# Patient Record
Sex: FEMALE | Race: BLACK OR AFRICAN AMERICAN | Employment: UNEMPLOYED | URBAN - METROPOLITAN AREA
[De-identification: names, ages, dates, MRNs, and addresses within clinical notes are randomized per-mention and may not be internally consistent; named-entity substitution may affect disease eponyms.]

---

## 2017-04-23 ENCOUNTER — HOSPITAL ENCOUNTER (EMERGENCY)
Age: 52
Discharge: HOME | End: 2017-04-23
Attending: STUDENT IN AN ORGANIZED HEALTH CARE EDUCATION/TRAINING PROGRAM | Admitting: STUDENT IN AN ORGANIZED HEALTH CARE EDUCATION/TRAINING PROGRAM
Payer: COMMERCIAL

## 2017-04-23 VITALS
DIASTOLIC BLOOD PRESSURE: 84 MMHG | BODY MASS INDEX: 27.16 KG/M2 | SYSTOLIC BLOOD PRESSURE: 134 MMHG | RESPIRATION RATE: 18 BRPM | TEMPERATURE: 99 F | OXYGEN SATURATION: 100 % | WEIGHT: 163 LBS | HEART RATE: 78 BPM | HEIGHT: 65 IN

## 2017-04-23 DIAGNOSIS — R05.9 COUGH: ICD-10-CM

## 2017-04-23 DIAGNOSIS — R03.0 ELEVATED BLOOD PRESSURE READING: Primary | ICD-10-CM

## 2017-04-23 LAB
ALBUMIN SERPL BCP-MCNC: 3.7 G/DL (ref 3.5–5)
ALBUMIN/GLOB SERPL: 0.8 {RATIO} (ref 1.1–2.2)
ALP SERPL-CCNC: 71 U/L (ref 45–117)
ALT SERPL-CCNC: 21 U/L (ref 12–78)
ANION GAP BLD CALC-SCNC: 5 MMOL/L (ref 5–15)
AST SERPL W P-5'-P-CCNC: 7 U/L (ref 15–37)
BASOPHILS # BLD AUTO: 0 K/UL (ref 0–0.1)
BASOPHILS # BLD: 0 % (ref 0–1)
BILIRUB SERPL-MCNC: 0.6 MG/DL (ref 0.2–1)
BUN SERPL-MCNC: 9 MG/DL (ref 6–20)
BUN/CREAT SERPL: 12 (ref 12–20)
CALCIUM SERPL-MCNC: 8.5 MG/DL (ref 8.5–10.1)
CHLORIDE SERPL-SCNC: 104 MMOL/L (ref 97–108)
CO2 SERPL-SCNC: 28 MMOL/L (ref 21–32)
CREAT SERPL-MCNC: 0.76 MG/DL (ref 0.55–1.02)
EOSINOPHIL # BLD: 0.1 K/UL (ref 0–0.4)
EOSINOPHIL NFR BLD: 1 % (ref 0–7)
ERYTHROCYTE [DISTWIDTH] IN BLOOD BY AUTOMATED COUNT: 14.6 % (ref 11.5–14.5)
GLOBULIN SER CALC-MCNC: 4.8 G/DL (ref 2–4)
GLUCOSE SERPL-MCNC: 87 MG/DL (ref 65–100)
HCT VFR BLD AUTO: 36.4 % (ref 35–47)
HGB BLD-MCNC: 11.7 G/DL (ref 11.5–16)
LYMPHOCYTES # BLD AUTO: 11 % (ref 12–49)
LYMPHOCYTES # BLD: 0.9 K/UL (ref 0.8–3.5)
MCH RBC QN AUTO: 28.5 PG (ref 26–34)
MCHC RBC AUTO-ENTMCNC: 32.1 G/DL (ref 30–36.5)
MCV RBC AUTO: 88.8 FL (ref 80–99)
MONOCYTES # BLD: 1 K/UL (ref 0–1)
MONOCYTES NFR BLD AUTO: 12 % (ref 5–13)
NEUTS SEG # BLD: 6.1 K/UL (ref 1.8–8)
NEUTS SEG NFR BLD AUTO: 76 % (ref 32–75)
PLATELET # BLD AUTO: 277 K/UL (ref 150–400)
POTASSIUM SERPL-SCNC: 3.7 MMOL/L (ref 3.5–5.1)
PROT SERPL-MCNC: 8.5 G/DL (ref 6.4–8.2)
RBC # BLD AUTO: 4.1 M/UL (ref 3.8–5.2)
SODIUM SERPL-SCNC: 137 MMOL/L (ref 136–145)
TROPONIN I SERPL-MCNC: <0.04 NG/ML
WBC # BLD AUTO: 8.1 K/UL (ref 3.6–11)

## 2017-04-23 PROCEDURE — 85025 COMPLETE CBC W/AUTO DIFF WBC: CPT | Performed by: STUDENT IN AN ORGANIZED HEALTH CARE EDUCATION/TRAINING PROGRAM

## 2017-04-23 PROCEDURE — 84484 ASSAY OF TROPONIN QUANT: CPT | Performed by: STUDENT IN AN ORGANIZED HEALTH CARE EDUCATION/TRAINING PROGRAM

## 2017-04-23 PROCEDURE — 99283 EMERGENCY DEPT VISIT LOW MDM: CPT

## 2017-04-23 PROCEDURE — 36415 COLL VENOUS BLD VENIPUNCTURE: CPT | Performed by: STUDENT IN AN ORGANIZED HEALTH CARE EDUCATION/TRAINING PROGRAM

## 2017-04-23 PROCEDURE — 80053 COMPREHEN METABOLIC PANEL: CPT | Performed by: STUDENT IN AN ORGANIZED HEALTH CARE EDUCATION/TRAINING PROGRAM

## 2017-04-23 PROCEDURE — 93005 ELECTROCARDIOGRAM TRACING: CPT

## 2017-04-23 RX ORDER — BENZONATATE 100 MG/1
100 CAPSULE ORAL
Qty: 30 CAP | Refills: 0 | Status: SHIPPED | OUTPATIENT
Start: 2017-04-23 | End: 2017-04-30

## 2017-04-23 NOTE — DISCHARGE INSTRUCTIONS
Elevated Blood Pressure: Care Instructions  Your Care Instructions    Blood pressure is a measure of how hard the blood pushes against the walls of your arteries. It's normal for blood pressure to go up and down throughout the day. But if it stays up over time, you have high blood pressure. Two numbers tell you your blood pressure. The first number is the systolic pressure. It shows how hard the blood pushes when your heart is pumping. The second number is the diastolic pressure. It shows how hard the blood pushes between heartbeats, when your heart is relaxed and filling with blood. An ideal blood pressure in adults is less than 120/80 (say \"120 over 80\"). High blood pressure is 140/90 or higher. You have high blood pressure if your top number is 140 or higher or your bottom number is 90 or higher, or both. The main test for high blood pressure is simple, fast, and painless. To diagnose high blood pressure, your doctor will test your blood pressure at different times. After testing your blood pressure, your doctor may ask you to test it again when you are home. If you are diagnosed with high blood pressure, you can work with your doctor to make a long-term plan to manage it. Follow-up care is a key part of your treatment and safety. Be sure to make and go to all appointments, and call your doctor if you are having problems. It's also a good idea to know your test results and keep a list of the medicines you take. How can you care for yourself at home? · Do not smoke. Smoking increases your risk for heart attack and stroke. If you need help quitting, talk to your doctor about stop-smoking programs and medicines. These can increase your chances of quitting for good. · Stay at a healthy weight. · Try to limit how much sodium you eat to less than 2,300 milligrams (mg) a day. Your doctor may ask you to try to eat less than 1,500 mg a day. · Be physically active.  Get at least 30 minutes of exercise on most days of the week. Walking is a good choice. You also may want to do other activities, such as running, swimming, cycling, or playing tennis or team sports. · Avoid or limit alcohol. Talk to your doctor about whether you can drink any alcohol. · Eat plenty of fruits, vegetables, and low-fat dairy products. Eat less saturated and total fats. · Learn how to check your blood pressure at home. When should you call for help? Call your doctor now or seek immediate medical care if:  · Your blood pressure is much higher than normal (such as 180/110 or higher). · You think high blood pressure is causing symptoms such as:  ¨ Severe headache. ¨ Blurry vision. Watch closely for changes in your health, and be sure to contact your doctor if:  · You do not get better as expected. Where can you learn more? Go to http://[employer] Solutionmando.info/. Enter W989 in the search box to learn more about \"Elevated Blood Pressure: Care Instructions. \"  Current as of: October 19, 2016  Content Version: 11.2  © 7299-8206 [employer]. Care instructions adapted under license by [employer] (which disclaims liability or warranty for this information). If you have questions about a medical condition or this instruction, always ask your healthcare professional. [address] any warranty or liability for your use of this information. Cough: Care Instructions  Your Care Instructions  A cough is your body's response to something that bothers your throat or airways. Many things can cause a cough. You might cough because of a cold or the flu, bronchitis, or asthma. Smoking, postnasal drip, allergies, and stomach acid that backs up into your throat also can cause coughs. A cough is a symptom, not a disease. Most coughs stop when the cause, such as a cold, goes away. You can take a few steps at home to cough less and feel better.   Follow-up care is a key part of your treatment and safety. Be sure to make and go to all appointments, and call your doctor if you are having problems. It's also a good idea to know your test results and keep a list of the medicines you take. How can you care for yourself at home? · Drink lots of water and other fluids. This helps thin the mucus and soothes a dry or sore throat. Honey or lemon juice in hot water or tea may ease a dry cough. · Take cough medicine as directed by your doctor. · Prop up your head on pillows to help you breathe and ease a dry cough. · Try cough drops to soothe a dry or sore throat. Cough drops don't stop a cough. Medicine-flavored cough drops are no better than candy-flavored drops or hard candy. · Do not smoke. Avoid secondhand smoke. If you need help quitting, talk to your doctor about stop-smoking programs and medicines. These can increase your chances of quitting for good. When should you call for help? Call 911 anytime you think you may need emergency care. For example, call if:  · You have severe trouble breathing. Call your doctor now or seek immediate medical care if:  · You cough up blood. · You have new or worse trouble breathing. · You have a new or higher fever. · You have a new rash. Watch closely for changes in your health, and be sure to contact your doctor if:  · You cough more deeply or more often, especially if you notice more mucus or a change in the color of your mucus. · You have new symptoms, such as a sore throat, an earache, or sinus pain. · You do not get better as expected. Where can you learn more? Go to http://davin-mando.info/. Enter D279 in the search box to learn more about \"Cough: Care Instructions. \"  Current as of: May 27, 2016  Content Version: 11.2  © 6332-8036 [employer]. Care instructions adapted under license by [employer] (which disclaims liability or warranty for this information).  If you have questions about a medical condition or this instruction, always ask your healthcare professional. [place] [address] any warranty or liability for your use of this information. We hope that we have addressed all of your medical concerns. The examination and treatment you received in the Emergency Department were for an emergent problem and were not intended as complete care. It is important that you follow up with your healthcare provider(s) for ongoing care. If your symptoms worsen or do not improve as expected, and you are unable to reach your usual health care provider(s), you should return to the Emergency Department. Today's healthcare is undergoing tremendous change, and patient satisfaction surveys are one of the many tools to assess the quality of medical care. You may receive a survey from the [facility] regarding your experience in the Emergency Department. I hope that your experience has been completely positive, particularly the medical care that I provided. As such, please participate in the survey; anything less than excellent does not meet my expectations or intentions. [facility] and [address] [place] [address] participate in nationally recognized quality of care measures. If your blood pressure is greater than 120/80, as reported below, we urge that you seek medical care to address the potential of high blood pressure, commonly known as hypertension. Hypertension can be hereditary or can be caused by certain medical conditions, pain, stress, or \"white coat syndrome. \"       Please make an appointment with your health care provider(s) for follow up of your Emergency Department visit. VITALS:   Patient Vitals for the past 8 hrs:   Temp Pulse Resp BP SpO2   04/23/17 1622 99.1 °F (37.3 °C) 78 16 (!) 159/102 98 %          Thank you for allowing us to provide you with medical care today. We realize that you have many choices for your emergency care needs. Please choose us in the future for any continued health care needs. Kyle Mendoza, [facility].   Office: [phone]            Recent Results (from the past 24 hour(s))   CBC WITH AUTOMATED DIFF    Collection Time: 04/23/17  4:35 PM   Result Value Ref Range    WBC 8.1 3.6 - 11.0 K/uL    RBC 4.10 3.80 - 5.20 M/uL    HGB 11.7 11.5 - 16.0 g/dL    HCT 36.4 35.0 - 47.0 %    MCV 88.8 80.0 - 99.0 FL    MCH 28.5 26.0 - 34.0 PG    MCHC 32.1 30.0 - 36.5 g/dL    RDW 14.6 (H) 11.5 - 14.5 %    PLATELET 488 431 - 002 K/uL    NEUTROPHILS 76 (H) 32 - 75 %    LYMPHOCYTES 11 (L) 12 - 49 %    MONOCYTES 12 5 - 13 %    EOSINOPHILS 1 0 - 7 %    BASOPHILS 0 0 - 1 %    ABS. NEUTROPHILS 6.1 1.8 - 8.0 K/UL    ABS. LYMPHOCYTES 0.9 0.8 - 3.5 K/UL    ABS. MONOCYTES 1.0 0.0 - 1.0 K/UL    ABS. EOSINOPHILS 0.1 0.0 - 0.4 K/UL    ABS. BASOPHILS 0.0 0.0 - 0.1 K/UL   METABOLIC PANEL, COMPREHENSIVE    Collection Time: 04/23/17  4:35 PM   Result Value Ref Range    Sodium 137 136 - 145 mmol/L    Potassium 3.7 3.5 - 5.1 mmol/L    Chloride 104 97 - 108 mmol/L    CO2 28 21 - 32 mmol/L    Anion gap 5 5 - 15 mmol/L    Glucose 87 65 - 100 mg/dL    BUN 9 6 - 20 MG/DL    Creatinine 0.76 0.55 - 1.02 MG/DL    BUN/Creatinine ratio 12 12 - 20      GFR est AA >60 >60 ml/min/1.73m2    GFR est non-AA >60 >60 ml/min/1.73m2    Calcium 8.5 8.5 - 10.1 MG/DL    Bilirubin, total 0.6 0.2 - 1.0 MG/DL    ALT (SGPT) 21 12 - 78 U/L    AST (SGOT) 7 (L) 15 - 37 U/L    Alk.  phosphatase 71 45 - 117 U/L    Protein, total 8.5 (H) 6.4 - 8.2 g/dL    Albumin 3.7 3.5 - 5.0 g/dL    Globulin 4.8 (H) 2.0 - 4.0 g/dL    A-G Ratio 0.8 (L) 1.1 - 2.2     TROPONIN I    Collection Time: 04/23/17  4:35 PM   Result Value Ref Range    Troponin-I, Qt. <0.04 <0.05 ng/mL   EKG, 12 LEAD, INITIAL    Collection Time: 04/23/17  4:37 PM   Result Value Ref Range    Ventricular Rate 84 BPM    Atrial Rate 84 BPM    P-R Interval 130 ms    QRS Duration 76 ms Q-T Interval 376 ms    QTC Calculation (Bezet) 444 ms    Calculated P Axis 76 degrees    Calculated R Axis 74 degrees    Calculated T Axis 42 degrees    Diagnosis       Normal sinus rhythm  Nonspecific ST abnormality  When compared with ECG of 26-DEC-2014 16:26,  No significant change was found         No results found.

## 2017-04-23 NOTE — ED TRIAGE NOTES
TRIAGE NOTE: \"I went to [facility] because of some upper respiratory issues. She checked my pressure and told me to come here, it was 150/110.

## 2017-04-23 NOTE — ED PROVIDER NOTES
HPI Comments: 49 y.o. female with past medical history significant for asthma and hypothyroidism who presents from home with chief complaint of hypertension. Pt states that she has had a sore throat, cough, and lightheadedness for the past week which has been progressively worsening. She reports going to Urgent Care 1 week ago and was diagnosed with seasonal allergies. Pt states that she went to [facility] today and was told to come to the ER after having her blood pressure evaluated. She reports that her blood pressure readings were 160/110 and 155/110. Pt reportedly has an appointment to receive a chest x-ray tomorrow and is seeing a lung specialist on Tuesday for her asthma. She denies having any hx of HTN. She denies any leg swelling. There are no other acute medical concerns at this time. Social hx: nonsmoker, no EtOH use, no drug use  PCP: Janell Caicedo MD    Note written by Tanya Artis, as dictated by Fallon Schafer MD 5:22 PM      The history is provided by the patient. No  was used. Past Medical History:   Diagnosis Date    Asthma     Hyperthyroidism        Past Surgical History:   Procedure Laterality Date    BREAST SURGERY PROCEDURE UNLISTED      cyst left breast    HX HYSTERECTOMY      still has ovaries    HX POLYPECTOMY      removed from nose         History reviewed. No pertinent family history. Social History     Social History    Marital status: SINGLE     Spouse name: N/A    Number of children: N/A    Years of education: N/A     Occupational History    Not on file. Social History Main Topics    Smoking status: Never Smoker    Smokeless tobacco: Not on file    Alcohol use No    Drug use: No    Sexual activity: Not on file     Other Topics Concern    Not on file     Social History Narrative         ALLERGIES: Latex; Other food; Sulfa (sulfonamide antibiotics);  Sweet potato; and Wheat    Review of Systems   Constitutional: Negative for chills, diaphoresis and fever. HENT: Positive for sore throat. Negative for congestion, postnasal drip and rhinorrhea. Eyes: Negative for photophobia, discharge, redness and visual disturbance. Respiratory: Positive for cough. Negative for chest tightness, shortness of breath and wheezing. Cardiovascular: Negative for chest pain, palpitations and leg swelling. Gastrointestinal: Negative for abdominal distention, abdominal pain, blood in stool, constipation, diarrhea, nausea and vomiting. Genitourinary: Negative for difficulty urinating, dysuria, frequency, hematuria and urgency. Musculoskeletal: Positive for myalgias (generalized). Negative for arthralgias, back pain and joint swelling. Skin: Negative for color change and rash. Neurological: Positive for light-headedness. Negative for dizziness, speech difficulty, weakness, numbness and headaches. Psychiatric/Behavioral: Negative for confusion. The patient is not nervous/anxious. All other systems reviewed and are negative. Vitals:    04/23/17 1622   BP: (!) 159/102   Pulse: 78   Resp: 16   Temp: 99.1 °F (37.3 °C)   SpO2: 98%   Weight: 73.9 kg (163 lb)   Height: 5' 5\" (1.651 m)            Physical Exam   Constitutional: She is oriented to person, place, and time. She appears well-developed and well-nourished. HENT:   Head: Normocephalic and atraumatic. Eyes: Conjunctivae and EOM are normal. Pupils are equal, round, and reactive to light. Neck: Normal range of motion. Neck supple. Cardiovascular: Normal rate, regular rhythm and normal heart sounds. No murmur heard. Pulmonary/Chest: Effort normal. No respiratory distress. She has wheezes (bilaterally in bases). She has rhonchi (bilaterally in bases). Abdominal: Soft. Bowel sounds are normal. She exhibits no distension. There is no tenderness. There is no rebound. Musculoskeletal: Normal range of motion. She exhibits no edema.    Neurological: She is alert and oriented to person, place, and time. No cranial nerve deficit. She exhibits normal muscle tone. Coordination normal.   Skin: Skin is warm and dry. No rash noted. Psychiatric: She has a normal mood and affect. Her behavior is normal.   Nursing note and vitals reviewed. Note written by Tanya Vallecillo, as dictated by Martha Lyons MD 5:22 PM    MDM  Number of Diagnoses or Management Options  Cough:   Elevated blood pressure reading:   Diagnosis management comments: No evidence of end-organ damage. Will have pt follow up with PCP for BP check and initiation of anti-hypertensive medication if needed. ED Course       Procedures   ED EKG interpretation:  Rhythm: normal sinus rhythm; and regular . Rate (approx.): 84; Axis: normal; ST/T wave: non-specific changes; no STEMI, no acute ischemia.   Note written by Tanya Vallecillo, as dictated by Martha Lyons MD 4:37 PM

## 2017-04-24 ENCOUNTER — HOSPITAL ENCOUNTER (OUTPATIENT)
Dept: GENERAL RADIOLOGY | Age: 52
Discharge: HOME | End: 2017-04-24
Payer: COMMERCIAL

## 2017-04-24 DIAGNOSIS — J45.909 ASTHMA: ICD-10-CM

## 2017-04-24 LAB
ATRIAL RATE: 84 BPM
CALCULATED P AXIS, ECG09: 76 DEGREES
CALCULATED R AXIS, ECG10: 74 DEGREES
CALCULATED T AXIS, ECG11: 42 DEGREES
DIAGNOSIS, 93000: NORMAL
P-R INTERVAL, ECG05: 130 MS
Q-T INTERVAL, ECG07: 376 MS
QRS DURATION, ECG06: 76 MS
QTC CALCULATION (BEZET), ECG08: 444 MS
VENTRICULAR RATE, ECG03: 84 BPM

## 2017-04-24 PROCEDURE — 71020 XR CHEST PA LAT: CPT

## 2018-10-26 ENCOUNTER — APPOINTMENT (OUTPATIENT)
Dept: CT IMAGING | Age: 53
End: 2018-10-26
Attending: EMERGENCY MEDICINE
Payer: SELF-PAY

## 2018-10-26 ENCOUNTER — HOSPITAL ENCOUNTER (EMERGENCY)
Age: 53
Discharge: HOME | End: 2018-10-26
Attending: EMERGENCY MEDICINE
Payer: SELF-PAY

## 2018-10-26 VITALS
WEIGHT: 160.27 LBS | RESPIRATION RATE: 16 BRPM | HEART RATE: 54 BPM | SYSTOLIC BLOOD PRESSURE: 132 MMHG | OXYGEN SATURATION: 98 % | TEMPERATURE: 98.1 F | DIASTOLIC BLOOD PRESSURE: 88 MMHG | BODY MASS INDEX: 25.76 KG/M2 | HEIGHT: 66 IN

## 2018-10-26 DIAGNOSIS — R03.0 ELEVATED BLOOD PRESSURE READING: ICD-10-CM

## 2018-10-26 DIAGNOSIS — J32.8 OTHER CHRONIC SINUSITIS: Primary | ICD-10-CM

## 2018-10-26 DIAGNOSIS — M79.602 LEFT ARM PAIN: ICD-10-CM

## 2018-10-26 DIAGNOSIS — R51.9 ACUTE NONINTRACTABLE HEADACHE, UNSPECIFIED HEADACHE TYPE: ICD-10-CM

## 2018-10-26 DIAGNOSIS — R42 LIGHTHEADEDNESS: ICD-10-CM

## 2018-10-26 LAB
ALBUMIN SERPL-MCNC: 3.6 G/DL (ref 3.5–5)
ALBUMIN/GLOB SERPL: 0.9 {RATIO} (ref 1.1–2.2)
ALP SERPL-CCNC: 66 U/L (ref 45–117)
ALT SERPL-CCNC: 25 U/L (ref 12–78)
ANION GAP SERPL CALC-SCNC: 3 MMOL/L (ref 5–15)
AST SERPL-CCNC: 17 U/L (ref 15–37)
BASOPHILS # BLD: 0 K/UL (ref 0–0.1)
BASOPHILS NFR BLD: 0 % (ref 0–1)
BILIRUB SERPL-MCNC: 0.3 MG/DL (ref 0.2–1)
BUN SERPL-MCNC: 6 MG/DL (ref 6–20)
BUN/CREAT SERPL: 8 (ref 12–20)
CALCIUM SERPL-MCNC: 8.3 MG/DL (ref 8.5–10.1)
CHLORIDE SERPL-SCNC: 106 MMOL/L (ref 97–108)
CK SERPL-CCNC: 79 U/L (ref 26–192)
CO2 SERPL-SCNC: 31 MMOL/L (ref 21–32)
COMMENT, HOLDF: NORMAL
CREAT SERPL-MCNC: 0.77 MG/DL (ref 0.55–1.02)
DIFFERENTIAL METHOD BLD: ABNORMAL
EOSINOPHIL # BLD: 0 K/UL (ref 0–0.4)
EOSINOPHIL NFR BLD: 0 % (ref 0–7)
ERYTHROCYTE [DISTWIDTH] IN BLOOD BY AUTOMATED COUNT: 13.2 % (ref 11.5–14.5)
GLOBULIN SER CALC-MCNC: 3.9 G/DL (ref 2–4)
GLUCOSE SERPL-MCNC: 108 MG/DL (ref 65–100)
HCT VFR BLD AUTO: 33.8 % (ref 35–47)
HGB BLD-MCNC: 11 G/DL (ref 11.5–16)
IMM GRANULOCYTES # BLD: 0 K/UL (ref 0–0.04)
IMM GRANULOCYTES NFR BLD AUTO: 0 % (ref 0–0.5)
LYMPHOCYTES # BLD: 0.6 K/UL (ref 0.8–3.5)
LYMPHOCYTES NFR BLD: 16 % (ref 12–49)
MAGNESIUM SERPL-MCNC: 1.8 MG/DL (ref 1.6–2.4)
MCH RBC QN AUTO: 30.1 PG (ref 26–34)
MCHC RBC AUTO-ENTMCNC: 32.5 G/DL (ref 30–36.5)
MCV RBC AUTO: 92.3 FL (ref 80–99)
MONOCYTES # BLD: 0.2 K/UL (ref 0–1)
MONOCYTES NFR BLD: 4 % (ref 5–13)
NEUTS SEG # BLD: 3 K/UL (ref 1.8–8)
NEUTS SEG NFR BLD: 80 % (ref 32–75)
NRBC # BLD: 0 K/UL (ref 0–0.01)
NRBC BLD-RTO: 0 PER 100 WBC
PLATELET # BLD AUTO: 169 K/UL (ref 150–400)
PMV BLD AUTO: 10.7 FL (ref 8.9–12.9)
POTASSIUM SERPL-SCNC: 4.5 MMOL/L (ref 3.5–5.1)
PROT SERPL-MCNC: 7.5 G/DL (ref 6.4–8.2)
RBC # BLD AUTO: 3.66 M/UL (ref 3.8–5.2)
RBC MORPH BLD: ABNORMAL
SAMPLES BEING HELD,HOLD: NORMAL
SODIUM SERPL-SCNC: 140 MMOL/L (ref 136–145)
TROPONIN I SERPL-MCNC: <0.05 NG/ML
WBC # BLD AUTO: 3.8 K/UL (ref 3.6–11)

## 2018-10-26 PROCEDURE — 85025 COMPLETE CBC W/AUTO DIFF WBC: CPT | Performed by: EMERGENCY MEDICINE

## 2018-10-26 PROCEDURE — 83735 ASSAY OF MAGNESIUM: CPT | Performed by: EMERGENCY MEDICINE

## 2018-10-26 PROCEDURE — 36415 COLL VENOUS BLD VENIPUNCTURE: CPT | Performed by: EMERGENCY MEDICINE

## 2018-10-26 PROCEDURE — 84484 ASSAY OF TROPONIN QUANT: CPT | Performed by: EMERGENCY MEDICINE

## 2018-10-26 PROCEDURE — 82550 ASSAY OF CK (CPK): CPT | Performed by: EMERGENCY MEDICINE

## 2018-10-26 PROCEDURE — 99285 EMERGENCY DEPT VISIT HI MDM: CPT

## 2018-10-26 PROCEDURE — 80053 COMPREHEN METABOLIC PANEL: CPT | Performed by: EMERGENCY MEDICINE

## 2018-10-26 PROCEDURE — 70450 CT HEAD/BRAIN W/O DYE: CPT

## 2018-10-26 PROCEDURE — 93005 ELECTROCARDIOGRAM TRACING: CPT

## 2018-10-26 RX ORDER — METHOCARBAMOL 750 MG/1
750 TABLET, FILM COATED ORAL
Qty: 20 TAB | Refills: 0 | Status: SHIPPED | OUTPATIENT
Start: 2018-10-26 | End: 2018-12-28

## 2018-10-26 NOTE — ED NOTES
Pt ambulated to restroom with no disturbance in gait. Pt has UA bedside if needed. WCTM. Pt on monitors x 2. Pt. resting comfortably in bed, denies needs at this time. Bed locked and low, call bell in reach.

## 2018-10-26 NOTE — ED NOTES
Bedside and Verbal shift change report given to Tyrell Cali RN (oncoming nurse) by Heather Pacheco RN (offgoing nurse). Report included the following information SBAR, Kardex, ED Summary, [facility] and Recent Results.

## 2018-10-26 NOTE — ED PROVIDER NOTES
EMERGENCY DEPARTMENT HISTORY AND PHYSICAL EXAM 
 
 
Date: 10/26/2018 Patient Name: Diamond Hartmann History of Presenting Illness Chief Complaint Patient presents with  Dizziness Patient ambulatory to triage with steady gait and complain of dizziness and \"high blood pressure\" History Provided By: Patient HPI: Diamond Hartmann, 47 y.o. female with PMHx significant for hyperthyroidism, asthma, presents ambulatory to the ED with cc of new onset elevated BP, HA and substernal CP with associated L sided facial numbness, nausea, lightheadedness and LUE pain x 3 days. Pt states she has been persistently lightheaded for 3 weeks. She additionally states that her LUE pain has been affecting her since August 2018. She attributed her CP to her hx of bronchitis and has been using her home inhaler. Pt initially though that her LUE pain was due to a sports related injury, so she went to her PCP. There, her BP was in the 925'D systolic. Pt was concerned about her other sxs along with her elevated BP, so she came into the ED [facility] ED for evaluation. Pt has been managing her HA and CP with OTC tylenol. Denies head trauma, neck pain, extremity weakness, vision changes, vomiting, personal hx of HTN, fever or chills. Endorses familal hx of HTN. Pt does not smoke cigarettes or drink alcohol. There are no other complaints, changes, or physical findings at this time. PCP: Enma Dodd MD 
 
Current Outpatient Medications Medication Sig Dispense Refill  methocarbamol (ROBAXIN-750) 750 mg tablet Take 1 Tab by mouth four (4) times daily as needed. 20 Tab 0  
 methimazole (TAPAZOLE) 10 mg tablet Take 10 mg by mouth daily.  ergocalciferol (VITAMIN D2) 50,000 unit capsule Take 50,000 Units by mouth. Patient takes on Thursday's  albuterol (VENTOLIN HFA) 90 mcg/actuation inhaler Take 2 puffs by inhalation every four (4) hours as needed for Wheezing.  ALBUTEROL IN Take 2 puffs by inhalation every four (4) hours as needed.  albuterol (PROVENTIL HFA) 90 mcg/actuation inhaler Take 2 puffs by inhalation every four (4) hours as needed for Wheezing. 1 Inhaler 0 Past History Past Medical History: 
Past Medical History:  
Diagnosis Date  Asthma  Hyperthyroidism Past Surgical History: 
Past Surgical History:  
Procedure Laterality Date  BREAST SURGERY PROCEDURE UNLISTED    
 cyst left breast  
 HX HYSTERECTOMY    
 still has ovaries  HX POLYPECTOMY    
 removed from nose Family History: No family history on file. Social History: 
Social History Tobacco Use  Smoking status: Never Smoker Substance Use Topics  Alcohol use: No  
 Drug use: No  
 
 
Allergies: Allergies Allergen Reactions  Latex Rash  Other Food Hives Rye  Advil [Ibuprofen] Anaphylaxis  Sulfa (Sulfonamide Antibiotics) Rash  Sweet Potato Hives  Wheat Hives Review of Systems Review of Systems Constitutional: Negative for chills and fever. HENT: Negative for congestion. Eyes: Negative. Negative for visual disturbance. Respiratory: Negative for chest tightness. Cardiovascular: Positive for chest pain (substernal). Gastrointestinal: Positive for nausea. Negative for vomiting. Endocrine: Negative for heat intolerance. Genitourinary: Negative. Musculoskeletal: Positive for myalgias (LUE). Negative for neck pain. Skin: Negative for rash. Allergic/Immunologic: Negative for immunocompromised state. Neurological: Positive for light-headedness, numbness (facial) and headaches. Negative for weakness. Hematological: Does not bruise/bleed easily. Psychiatric/Behavioral: Negative. All other systems reviewed and are negative. Physical Exam  
Physical Exam  
Constitutional: She is oriented to person, place, and time. She appears well-developed and well-nourished. No distress (NAD). HENT:  
 Head: Normocephalic and atraumatic. Eyes: EOM are normal. Pupils are equal, round, and reactive to light. Neck: Normal range of motion. Neck supple. Cardiovascular: Normal rate, regular rhythm and normal heart sounds. Pulmonary/Chest: Effort normal and breath sounds normal. No respiratory distress. Abdominal: Soft. Bowel sounds are normal. She exhibits no mass. There is no tenderness. Musculoskeletal: Normal range of motion. She exhibits no edema. Neurological: She is alert and oriented to person, place, and time. Coordination normal.  
decreased sensation LUE, motor/sensory intact Skin: Skin is warm and dry. Psychiatric: Her behavior is normal. Her mood appears anxious. anxious Nursing note and vitals reviewed. Diagnostic Study Results Labs - Recent Results (from the past 12 hour(s)) EKG, 12 LEAD, INITIAL Collection Time: 10/26/18  5:49 PM  
Result Value Ref Range Ventricular Rate 59 BPM  
 Atrial Rate 59 BPM  
 P-R Interval 132 ms QRS Duration 78 ms Q-T Interval 452 ms QTC Calculation (Bezet) 447 ms Calculated P Axis 67 degrees Calculated R Axis 14 degrees Calculated T Axis 34 degrees Diagnosis Sinus bradycardia Possible Left atrial enlargement Cannot rule out Anterior infarct , age undetermined When compared with ECG of 23-APR-2017 16:37, 
QRS voltage has decreased CBC WITH AUTOMATED DIFF Collection Time: 10/26/18  6:52 PM  
Result Value Ref Range WBC 3.8 3.6 - 11.0 K/uL  
 RBC 3.66 (L) 3.80 - 5.20 M/uL  
 HGB 11.0 (L) 11.5 - 16.0 g/dL HCT 33.8 (L) 35.0 - 47.0 % MCV 92.3 80.0 - 99.0 FL  
 MCH 30.1 26.0 - 34.0 PG  
 MCHC 32.5 30.0 - 36.5 g/dL  
 RDW 13.2 11.5 - 14.5 % PLATELET 042 882 - 469 K/uL MPV 10.7 8.9 - 12.9 FL  
 NRBC 0.0 0  WBC ABSOLUTE NRBC 0.00 0.00 - 0.01 K/uL NEUTROPHILS 80 (H) 32 - 75 % LYMPHOCYTES 16 12 - 49 % MONOCYTES 4 (L) 5 - 13 % EOSINOPHILS 0 0 - 7 % BASOPHILS 0 0 - 1 % IMMATURE GRANULOCYTES 0 0.0 - 0.5 % ABS. NEUTROPHILS 3.0 1.8 - 8.0 K/UL  
 ABS. LYMPHOCYTES 0.6 (L) 0.8 - 3.5 K/UL  
 ABS. MONOCYTES 0.2 0.0 - 1.0 K/UL  
 ABS. EOSINOPHILS 0.0 0.0 - 0.4 K/UL  
 ABS. BASOPHILS 0.0 0.0 - 0.1 K/UL  
 ABS. IMM. GRANS. 0.0 0.00 - 0.04 K/UL  
 DF AUTOMATED    
 RBC COMMENTS NORMOCYTIC, NORMOCHROMIC METABOLIC PANEL, COMPREHENSIVE Collection Time: 10/26/18  6:52 PM  
Result Value Ref Range Sodium 140 136 - 145 mmol/L Potassium 4.5 3.5 - 5.1 mmol/L Chloride 106 97 - 108 mmol/L  
 CO2 31 21 - 32 mmol/L Anion gap 3 (L) 5 - 15 mmol/L Glucose 108 (H) 65 - 100 mg/dL BUN 6 6 - 20 MG/DL Creatinine 0.77 0.55 - 1.02 MG/DL  
 BUN/Creatinine ratio 8 (L) 12 - 20 GFR est AA >60 >60 ml/min/1.73m2 GFR est non-AA >60 >60 ml/min/1.73m2 Calcium 8.3 (L) 8.5 - 10.1 MG/DL Bilirubin, total 0.3 0.2 - 1.0 MG/DL  
 ALT (SGPT) 25 12 - 78 U/L  
 AST (SGOT) 17 15 - 37 U/L Alk. phosphatase 66 45 - 117 U/L Protein, total 7.5 6.4 - 8.2 g/dL Albumin 3.6 3.5 - 5.0 g/dL Globulin 3.9 2.0 - 4.0 g/dL A-G Ratio 0.9 (L) 1.1 - 2.2 MAGNESIUM Collection Time: 10/26/18  6:52 PM  
Result Value Ref Range Magnesium 1.8 1.6 - 2.4 mg/dL CK Collection Time: 10/26/18  6:52 PM  
Result Value Ref Range CK 79 26 - 192 U/L  
TROPONIN I Collection Time: 10/26/18  6:52 PM  
Result Value Ref Range Troponin-I, Qt. <0.05 <0.05 ng/mL SAMPLES BEING HELD Collection Time: 10/26/18  6:52 PM  
Result Value Ref Range SAMPLES BEING HELD RED COMMENT Add-on orders for these samples will be processed based on acceptable specimen integrity and analyte stability, which may vary by analyte. Radiologic Studies -  
CT HEAD WO CONT Final Result CT Results  (Last 48 hours) 10/26/18 2050  CT HEAD WO CONT Final result Impression:  IMPRESSION:   
   
No acute intracranial abnormality on this noncontrast head CT. Age indeterminate sinusitis may be chronic. There is evidence of previous  
maxillary sinus surgery. Narrative:  EXAM:  CT HEAD WO CONT INDICATION: Dizziness and hypertension today. COMPARISON: None TECHNIQUE: Noncontrast head CT. Coronal and sagittal reformats. CT dose  
reduction was achieved through the use of a standardized protocol tailored for  
this examination and automatic exposure control for dose modulation. FINDINGS: The ventricles and sulci are age-appropriate without hydrocephalus. There is no mass effect or midline shift. There is no intracranial hemorrhage or  
extra-axial fluid collection. There is no abnormal area of decreased density to  
suggest infarct. The calvarium is intact. Complete opacification of the frontal sinuses and  
ethmoid air cells. Partial opacification of the postsurgical maxillary sinuses  
and sphenoid sinus. Mastoid air cells and middle ear cavities are clear. CXR Results  (Last 48 hours) None Medical Decision Making I am the first provider for this patient. I reviewed the vital signs, available nursing notes, past medical history, past surgical history, family history and social history. Vital Signs-Reviewed the patient's vital signs. Patient Vitals for the past 12 hrs: 
 Temp Pulse Resp BP SpO2  
10/26/18 2000    128/87 97 % 10/26/18 1932  (!) 55 16 141/84   
10/26/18 1900 98.1 °F (36.7 °C) (!) 57 16 (!) 135/94 98 % 10/26/18 1856  (!) 53 16 133/88 98 % 10/26/18 1802  (!) 55  (!) 151/92 98 % 10/26/18 1737 98.2 °F (36.8 °C) 65 16 (!) 163/105  Pulse Oximetry Analysis - 98% on RA Cardiac Monitor:  
Rate: 55 bpm 
Rhythm: Sinus Bradycardia EKG interpretation: (Preliminary) 60 297 82 22 Rhythm: sinus bradycardia; and irregular.  Rate (approx.): 59 bpm; Axis: normal; OK interval: normal; QRS interval: normal ; ST/T wave: normal. 
 Written by Christiano Ayala, ED Scribe, as dictated by Kermit Spatz, MD. Records Reviewed: Nursing Notes and Old Medical Records Provider Notes (Medical Decision Making): DDx: Tension HA, HTN, radiculopathy, atypical CP, anxiety ED Course:  
Initial assessment performed. The patients presenting problems have been discussed, and they are in agreement with the care plan formulated and outlined with them. I have encouraged them to ask questions as they arise throughout their visit. Disposition: 
DISCHARGE NOTE: 
9:08 PM 
Pt has been reexamined. Pt has no new complaints, changes, or physical findings. Care plan outlined and precautions discussed. All available results reviewed with pt. All medications reviewed with pt. All of pts questions and concerns addressed. Pt agrees to f/u as instructed and agrees to return to ED upon further deterioration. Pt is ready to go home. Written by Christiano Ayala ED Scribe as dictated by Kermit Spatz, MD 
 
PLAN: 
1. Current Discharge Medication List  
  
START taking these medications Details  
methocarbamol (ROBAXIN-750) 750 mg tablet Take 1 Tab by mouth four (4) times daily as needed. Qty: 20 Tab, Refills: 0  
  
  
 
2. Follow-up Information Follow up With Specialties Details Why Contact Info Jocy Romero MD Internal Medicine In 3 days As needed [place] [id number] 
[phone] hospitals EMERGENCY DEPT Emergency Medicine  If symptoms worsen [address] [facility] 
[phone] Return to ED if worse Diagnosis Clinical Impression: 1. Other chronic sinusitis 2. Left arm pain 3. Acute nonintractable headache, unspecified headache type 4. Elevated blood pressure reading 5. Lightheadedness Attestations:  
 
This note is prepared by Christiano Ayala acting as scribe for Kermit Spatz, MD 
 
 Dona Pham MD : The scribe's documentation has been prepared under my direction and personally reviewed by me in its entirety. I confirm that the note above accurately reflects all work, treatment, procedures, and medical decision making performed by me.

## 2018-10-27 LAB
ATRIAL RATE: 59 BPM
CALCULATED P AXIS, ECG09: 67 DEGREES
CALCULATED R AXIS, ECG10: 14 DEGREES
CALCULATED T AXIS, ECG11: 34 DEGREES
DIAGNOSIS, 93000: NORMAL
P-R INTERVAL, ECG05: 132 MS
Q-T INTERVAL, ECG07: 452 MS
QRS DURATION, ECG06: 78 MS
QTC CALCULATION (BEZET), ECG08: 447 MS
VENTRICULAR RATE, ECG03: 59 BPM

## 2018-10-27 NOTE — ED NOTES
Dr Leonel Diamond has reviewed discharge instructions with the patient. The patient verbalized understanding. Pt. A&Ox4, respirations even and unlabored. VS stable as noted in flowsheet. Pt Declined wheelchair assist from department; paperwork in hand.

## 2018-10-27 NOTE — DISCHARGE INSTRUCTIONS
Elevated Blood Pressure: Care Instructions  Your Care Instructions    Blood pressure is a measure of how hard the blood pushes against the walls of your arteries. It's normal for blood pressure to go up and down throughout the day. But if it stays up over time, you have high blood pressure. Two numbers tell you your blood pressure. The first number is the systolic pressure. It shows how hard the blood pushes when your heart is pumping. The second number is the diastolic pressure. It shows how hard the blood pushes between heartbeats, when your heart is relaxed and filling with blood. An ideal blood pressure in adults is less than 120/80 (say \"120 over 80\"). High blood pressure is 140/90 or higher. You have high blood pressure if your top number is 140 or higher or your bottom number is 90 or higher, or both. The main test for high blood pressure is simple, fast, and painless. To diagnose high blood pressure, your doctor will test your blood pressure at different times. After testing your blood pressure, your doctor may ask you to test it again when you are home. If you are diagnosed with high blood pressure, you can work with your doctor to make a long-term plan to manage it. Follow-up care is a key part of your treatment and safety. Be sure to make and go to all appointments, and call your doctor if you are having problems. It's also a good idea to know your test results and keep a list of the medicines you take. How can you care for yourself at home? · Do not smoke. Smoking increases your risk for heart attack and stroke. If you need help quitting, talk to your doctor about stop-smoking programs and medicines. These can increase your chances of quitting for good. · Stay at a healthy weight. · Try to limit how much sodium you eat to less than 2,300 milligrams (mg) a day. Your doctor may ask you to try to eat less than 1,500 mg a day. · Be physically active.  Get at least 30 minutes of exercise on most days of the week. Walking is a good choice. You also may want to do other activities, such as running, swimming, cycling, or playing tennis or team sports. · Avoid or limit alcohol. Talk to your doctor about whether you can drink any alcohol. · Eat plenty of fruits, vegetables, and low-fat dairy products. Eat less saturated and total fats. · Learn how to check your blood pressure at home. When should you call for help? Call your doctor now or seek immediate medical care if:  ? · Your blood pressure is much higher than normal (such as 180/110 or higher). ? · You think high blood pressure is causing symptoms such as:  ¨ Severe headache. ¨ Blurry vision. ? Watch closely for changes in your health, and be sure to contact your doctor if:  ? · You do not get better as expected. Where can you learn more? Go to http://davinEnovexmando.info/. Enter P659 in the search box to learn more about \"Elevated Blood Pressure: Care Instructions. \"  Current as of: September 21, 2016  Content Version: 11.4  © 3833-8655 [employer]. Care instructions adapted under license by [employer] (which disclaims liability or warranty for this information). If you have questions about a medical condition or this instruction, always ask your healthcare professional. [address] any warranty or liability for your use of this information. Chronic Sinusitis: Care Instructions  Your Care Instructions    Sinusitis is an infection of the lining of the sinus cavities in your head. It causes pain and pressure in your head and face. Sinusitis can be short-term (acute) or long-term (chronic). Chronic sinusitis lasts 12 weeks or longer. It is often caused by a bacterial or fungal infection. Other things, such as allergies, may also be involved. Chronic sinusitis may be hard to treat. It can lead to permanent changes in the mucous membranes that line the sinuses.  It may make future sinus infections more likely. The infection may take some time to treat. Antibiotics are usually used if the infection is caused by bacteria. You may also need to use a corticosteroid nasal spray. If the infection is not cured after you try two or more different antibiotics, you may want to talk with your doctor about surgery or allergy testing. If the sinusitis is caused by a fungal infection, you may need to take antifungals or other medicines. You may also need surgery. Follow-up care is a key part of your treatment and safety. Be sure to make and go to all appointments, and call your doctor if you are having problems. It's also a good idea to know your test results and keep a list of the medicines you take. How can you care for yourself at home? Medicines    · Be safe with medicines. Take your medicines exactly as prescribed. Call your doctor if you think you are having a problem with your medicine. You will get more details on the specific medicines your doctor prescribes.     · Take your antibiotics as directed. Do not stop taking them just because you feel better. You need to take the full course of antibiotics.     · Your doctor may recommend a corticosteroid nasal spray, wash, drops, or pills. Take this medicine exactly as prescribed.    At home    · Breathe warm, moist air. You can use a steamy shower, a hot bath, or a sink filled with hot water. Avoid cold, dry air. Using a humidifier in your home may help. Follow the instructions for cleaning the machine.     · Use saline (saltwater) nasal washes every day. This helps keep your nasal passages open. It also can wash out mucus and bacteria. ? You can buy saline nose drops at a grocery store or drugstore. ? You can make your own at home. Add 1 teaspoon of salt and 1 teaspoon of baking soda to 2 cups of distilled water. If you make your own, fill a bulb syringe with the solution. Then insert the tip into your nostril and squeeze gently.  Dain Riggs your nose.     · Put a warm, wet towel or a warm gel pack on your face 3 or 4 times a day. Leave it on 5 to 10 minutes each time.     · Do not smoke or breathe secondhand smoke. Smoking can make sinusitis worse. If you need help quitting, talk to your doctor about stop-smoking programs and medicines. These can increase your chances of quitting for good. When should you call for help? Call your doctor now or seek immediate medical care if:    · You have new or worse symptoms of infection, such as:  ? Increased pain, swelling, warmth, or redness. ? Red streaks leading from the area. ? Pus draining from the area. ? A fever.    Watch closely for changes in your health, and be sure to contact your doctor if:    · The mucus from your nose becomes thicker (like pus) or has new blood in it.     · You do not get better as expected. Where can you learn more? Go to http://davin-mando.info/. Enter H010 in the search box to learn more about \"Chronic Sinusitis: Care Instructions. \"  Current as of: March 28, 2018  Content Version: 11.8  © 1761-3174 [employer]. Care instructions adapted under license by [employer] (which disclaims liability or warranty for this information). If you have questions about a medical condition or this instruction, always ask your healthcare professional. [address] any warranty or liability for your use of this information. Headache: Care Instructions  Your Care Instructions    Headaches have many possible causes. Most headaches aren't a sign of a more serious problem, and they will get better on their own. Home treatment may help you feel better faster. The doctor has checked you carefully, but problems can develop later. If you notice any problems or new symptoms, get medical treatment right away. Follow-up care is a key part of your treatment and safety.  Be sure to make and go to all appointments, and call your doctor if you are having problems. It's also a good idea to know your test results and keep a list of the medicines you take. How can you care for yourself at home? · Do not drive if you have taken a prescription pain medicine. · Rest in a quiet, dark room until your headache is gone. Close your eyes and try to relax or go to sleep. Don't watch TV or read. · Put a cold, moist cloth or cold pack on the painful area for 10 to 20 minutes at a time. Put a thin cloth between the cold pack and your skin. · Use a warm, moist towel or a heating pad set on low to relax tight shoulder and neck muscles. · Have someone gently massage your neck and shoulders. · Take pain medicines exactly as directed. ? If the doctor gave you a prescription medicine for pain, take it as prescribed. ? If you are not taking a prescription pain medicine, ask your doctor if you can take an over-the-counter medicine. · Be careful not to take pain medicine more often than the instructions allow, because you may get worse or more frequent headaches when the medicine wears off. · Do not ignore new symptoms that occur with a headache, such as a fever, weakness or numbness, vision changes, or confusion. These may be signs of a more serious problem. To prevent headaches  · Keep a headache diary so you can figure out what triggers your headaches. Avoiding triggers may help you prevent headaches. Record when each headache began, how long it lasted, and what the pain was like (throbbing, aching, stabbing, or dull). Write down any other symptoms you had with the headache, such as nausea, flashing lights or dark spots, or sensitivity to bright light or loud noise. Note if the headache occurred near your period. List anything that might have triggered the headache, such as certain foods (chocolate, cheese, wine) or odors, smoke, bright light, stress, or lack of sleep. · Find healthy ways to deal with stress.  Headaches are most common during or right after stressful times. Take time to relax before and after you do something that has caused a headache in the past.  · Try to keep your muscles relaxed by keeping good posture. Check your jaw, face, neck, and shoulder muscles for tension, and try relaxing them. When sitting at a desk, change positions often, and stretch for 30 seconds each hour. · Get plenty of sleep and exercise. · Eat regularly and well. Long periods without food can trigger a headache. · Treat yourself to a massage. Some people find that regular massages are very helpful in relieving tension. · Limit caffeine by not drinking too much coffee, tea, or soda. But don't quit caffeine suddenly, because that can also give you headaches. · Reduce eyestrain from computers by blinking frequently and looking away from the computer screen every so often. Make sure you have proper eyewear and that your monitor is set up properly, about an arm's length away. · Seek help if you have depression or anxiety. Your headaches may be linked to these conditions. Treatment can both prevent headaches and help with symptoms of anxiety or depression. When should you call for help? Call 911 anytime you think you may need emergency care. For example, call if:    · You have signs of a stroke. These may include:  ? Sudden numbness, paralysis, or weakness in your face, arm, or leg, especially on only one side of your body. ? Sudden vision changes. ? Sudden trouble speaking. ? Sudden confusion or trouble understanding simple statements. ? Sudden problems with walking or balance. ? A sudden, severe headache that is different from past headaches.    Call your doctor now or seek immediate medical care if:    · You have a new or worse headache.     · Your headache gets much worse. Where can you learn more? Go to http://davin-mando.info/. Enter M271 in the search box to learn more about \"Headache: Care Instructions. \"  Current as of: June 4, 2018  Content Version: 11.8  © 9435-7588 [employer]. Care instructions adapted under license by [employer] (which disclaims liability or warranty for this information). If you have questions about a medical condition or this instruction, always ask your healthcare professional. [address] any warranty or liability for your use of this information.

## 2018-12-28 ENCOUNTER — APPOINTMENT (OUTPATIENT)
Dept: GENERAL RADIOLOGY | Age: 53
End: 2018-12-28
Attending: EMERGENCY MEDICINE
Payer: SELF-PAY

## 2018-12-28 ENCOUNTER — HOSPITAL ENCOUNTER (EMERGENCY)
Age: 53
Discharge: HOME | End: 2018-12-28
Attending: EMERGENCY MEDICINE | Admitting: EMERGENCY MEDICINE
Payer: SELF-PAY

## 2018-12-28 VITALS
DIASTOLIC BLOOD PRESSURE: 57 MMHG | RESPIRATION RATE: 14 BRPM | OXYGEN SATURATION: 98 % | SYSTOLIC BLOOD PRESSURE: 106 MMHG | BODY MASS INDEX: 25.83 KG/M2 | WEIGHT: 155 LBS | HEART RATE: 90 BPM | HEIGHT: 65 IN | TEMPERATURE: 99.1 F

## 2018-12-28 DIAGNOSIS — J06.9 VIRAL UPPER RESPIRATORY ILLNESS: Primary | ICD-10-CM

## 2018-12-28 LAB
ANION GAP SERPL CALC-SCNC: 6 MMOL/L (ref 5–15)
BASOPHILS # BLD: 0 K/UL (ref 0–0.1)
BASOPHILS NFR BLD: 0 % (ref 0–1)
BUN SERPL-MCNC: 14 MG/DL (ref 6–20)
BUN/CREAT SERPL: 17 (ref 12–20)
CALCIUM SERPL-MCNC: 8.8 MG/DL (ref 8.5–10.1)
CHLORIDE SERPL-SCNC: 102 MMOL/L (ref 97–108)
CO2 SERPL-SCNC: 27 MMOL/L (ref 21–32)
CREAT SERPL-MCNC: 0.81 MG/DL (ref 0.55–1.02)
DIFFERENTIAL METHOD BLD: ABNORMAL
EOSINOPHIL # BLD: 0.1 K/UL (ref 0–0.4)
EOSINOPHIL NFR BLD: 1 % (ref 0–7)
ERYTHROCYTE [DISTWIDTH] IN BLOOD BY AUTOMATED COUNT: 14.4 % (ref 11.5–14.5)
FLUAV AG NPH QL IA: NEGATIVE
FLUBV AG NOSE QL IA: NEGATIVE
GLUCOSE SERPL-MCNC: 93 MG/DL (ref 65–100)
HCT VFR BLD AUTO: 34.8 % (ref 35–47)
HGB BLD-MCNC: 11.5 G/DL (ref 11.5–16)
IMM GRANULOCYTES # BLD: 0.1 K/UL (ref 0–0.04)
IMM GRANULOCYTES NFR BLD AUTO: 1 % (ref 0–0.5)
LYMPHOCYTES # BLD: 0.6 K/UL (ref 0.8–3.5)
LYMPHOCYTES NFR BLD: 5 % (ref 12–49)
MAGNESIUM SERPL-MCNC: 1.9 MG/DL (ref 1.6–2.4)
MCH RBC QN AUTO: 30.8 PG (ref 26–34)
MCHC RBC AUTO-ENTMCNC: 33 G/DL (ref 30–36.5)
MCV RBC AUTO: 93.3 FL (ref 80–99)
MONOCYTES # BLD: 0.9 K/UL (ref 0–1)
MONOCYTES NFR BLD: 7 % (ref 5–13)
NEUTS SEG # BLD: 10.8 K/UL (ref 1.8–8)
NEUTS SEG NFR BLD: 86 % (ref 32–75)
NRBC # BLD: 0 K/UL (ref 0–0.01)
NRBC BLD-RTO: 0 PER 100 WBC
PLATELET # BLD AUTO: 212 K/UL (ref 150–400)
PMV BLD AUTO: 10.4 FL (ref 8.9–12.9)
POTASSIUM SERPL-SCNC: 4.4 MMOL/L (ref 3.5–5.1)
RBC # BLD AUTO: 3.73 M/UL (ref 3.8–5.2)
RBC MORPH BLD: ABNORMAL
SODIUM SERPL-SCNC: 135 MMOL/L (ref 136–145)
WBC # BLD AUTO: 12.5 K/UL (ref 3.6–11)

## 2018-12-28 PROCEDURE — 96360 HYDRATION IV INFUSION INIT: CPT

## 2018-12-28 PROCEDURE — 83735 ASSAY OF MAGNESIUM: CPT

## 2018-12-28 PROCEDURE — 36415 COLL VENOUS BLD VENIPUNCTURE: CPT

## 2018-12-28 PROCEDURE — 99284 EMERGENCY DEPT VISIT MOD MDM: CPT

## 2018-12-28 PROCEDURE — 74011250637 HC RX REV CODE- 250/637: Performed by: EMERGENCY MEDICINE

## 2018-12-28 PROCEDURE — 74011250636 HC RX REV CODE- 250/636: Performed by: EMERGENCY MEDICINE

## 2018-12-28 PROCEDURE — 71046 X-RAY EXAM CHEST 2 VIEWS: CPT

## 2018-12-28 PROCEDURE — 85025 COMPLETE CBC W/AUTO DIFF WBC: CPT

## 2018-12-28 PROCEDURE — 80048 BASIC METABOLIC PNL TOTAL CA: CPT

## 2018-12-28 PROCEDURE — 87804 INFLUENZA ASSAY W/OPTIC: CPT

## 2018-12-28 RX ORDER — SODIUM CHLORIDE 9 MG/ML
1000 INJECTION, SOLUTION INTRAVENOUS ONCE
Status: COMPLETED | OUTPATIENT
Start: 2018-12-28 | End: 2018-12-28

## 2018-12-28 RX ORDER — OSELTAMIVIR PHOSPHATE 75 MG/1
75 CAPSULE ORAL DAILY
Qty: 5 CAP | Refills: 0 | Status: SHIPPED | OUTPATIENT
Start: 2018-12-28 | End: 2019-01-02

## 2018-12-28 RX ORDER — ACETAMINOPHEN 325 MG/1
975 TABLET ORAL
Status: COMPLETED | OUTPATIENT
Start: 2018-12-28 | End: 2018-12-28

## 2018-12-28 RX ADMIN — ACETAMINOPHEN 975 MG: 325 TABLET ORAL at 15:51

## 2018-12-28 RX ADMIN — SODIUM CHLORIDE 1000 ML: 900 INJECTION, SOLUTION INTRAVENOUS at 15:51

## 2018-12-28 NOTE — DISCHARGE INSTRUCTIONS

## 2018-12-28 NOTE — ED NOTES
Dr. Sunitha Ferrara, Resident reviewed discharge instructions with the patient. The patient verbalized understanding. Patient ambulatory out of ED with steady gait. No further complaints noted.

## 2018-12-28 NOTE — ED PROVIDER NOTES
EMERGENCY DEPARTMENT HISTORY AND PHYSICAL EXAM 
 
 
Date: 12/28/2018 Patient Name: Madison Capone History of Presenting Illness Chief Complaint Patient presents with  Fatigue Patient complain of generalized body aches and \"not feeling good\" for three days History Provided By: Patient HPI: Madison Capone, 47 y.o. female with PMHx significant for mild asthma, Graves dx, presents via personal vehcile to the ED with cc of fatigue, myalgias, chills. Patient reports 3 day history of ongoing chills, fatigue and myalgias with limited PO intake secondary to not feeling well. Patient also endorses mild headache. She is taking APAP at home for relief of symptoms with last dose this AM around 0900. She denies any nausea/vomiting, abdominal pain, chest pain, dyspnea. No known sick contacts. 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 There are no other complaints, changes, or physical findings at this time. PCP: Arthur Chung MD 
 
No current facility-administered medications on file prior to encounter. Current Outpatient Medications on File Prior to Encounter Medication Sig Dispense Refill  ergocalciferol (VITAMIN D2) 50,000 unit capsule Take 50,000 Units by mouth. Patient takes on Thursday's  albuterol (VENTOLIN HFA) 90 mcg/actuation inhaler Take 2 puffs by inhalation every four (4) hours as needed for Wheezing.  ALBUTEROL IN Take 2 puffs by inhalation every four (4) hours as needed.  albuterol (PROVENTIL HFA) 90 mcg/actuation inhaler Take 2 puffs by inhalation every four (4) hours as needed for Wheezing. 1 Inhaler 0 Past History Past Medical History: 
Past Medical History:  
Diagnosis Date  Asthma  Hyperthyroidism Past Surgical History: 
Past Surgical History:  
Procedure Laterality Date  BREAST SURGERY PROCEDURE UNLISTED    
 cyst left breast  
 HX HYSTERECTOMY    
 still has ovaries  HX POLYPECTOMY    
 removed from nose Family History: 
History reviewed. No pertinent family history. Social History: 
Social History Tobacco Use  Smoking status: Never Smoker Substance Use Topics  Alcohol use: No  
 Drug use: No  
 
 
Allergies: Allergies Allergen Reactions  Latex Rash  Other Food Hives Rye  Advil [Ibuprofen] Anaphylaxis  Sulfa (Sulfonamide Antibiotics) Rash  Sweet Potato Hives  Wheat Hives Review of Systems Review of Systems Constitutional: Positive for appetite change, chills, fatigue and fever. HENT: Negative for congestion and sore throat. Respiratory: Negative for cough and shortness of breath. Cardiovascular: Negative for chest pain. Gastrointestinal: Negative for abdominal pain, nausea and vomiting. Endocrine: Negative for polyuria. Genitourinary: Positive for decreased urine volume. Negative for dysuria. Musculoskeletal: Positive for myalgias. Negative for joint swelling. Skin: Negative for rash. Neurological: Positive for headaches. Physical Exam  
Physical Exam  
Constitutional: She is oriented to person, place, and time. She appears well-developed and well-nourished. HENT:  
Head: Normocephalic and atraumatic. Mouth/Throat: No oropharyngeal exudate. Eyes: EOM are normal. Pupils are equal, round, and reactive to light. Neck: Normal range of motion. Neck supple. Cardiovascular: Normal rate, regular rhythm and normal heart sounds. Pulmonary/Chest: Effort normal and breath sounds normal. No respiratory distress. Mild diffuse wheezing Abdominal: Soft. Bowel sounds are normal. She exhibits no distension. Musculoskeletal: Normal range of motion. She exhibits no deformity. Neurological: She is alert and oriented to person, place, and time. Skin: Skin is warm and dry. Psychiatric: She has a normal mood and affect. Judgment normal.  
 
 
Diagnostic Study Results Labs - Recent Results (from the past 12 hour(s)) CBC WITH AUTOMATED DIFF Collection Time: 12/28/18  3:47 PM  
Result Value Ref Range WBC 12.5 (H) 3.6 - 11.0 K/uL  
 RBC 3.73 (L) 3.80 - 5.20 M/uL  
 HGB 11.5 11.5 - 16.0 g/dL HCT 34.8 (L) 35.0 - 47.0 % MCV 93.3 80.0 - 99.0 FL  
 MCH 30.8 26.0 - 34.0 PG  
 MCHC 33.0 30.0 - 36.5 g/dL  
 RDW 14.4 11.5 - 14.5 % PLATELET 779 534 - 477 K/uL MPV 10.4 8.9 - 12.9 FL  
 NRBC 0.0 0  WBC ABSOLUTE NRBC 0.00 0.00 - 0.01 K/uL NEUTROPHILS 86 (H) 32 - 75 % LYMPHOCYTES 5 (L) 12 - 49 % MONOCYTES 7 5 - 13 % EOSINOPHILS 1 0 - 7 % BASOPHILS 0 0 - 1 % IMMATURE GRANULOCYTES 1 (H) 0.0 - 0.5 % ABS. NEUTROPHILS 10.8 (H) 1.8 - 8.0 K/UL  
 ABS. LYMPHOCYTES 0.6 (L) 0.8 - 3.5 K/UL  
 ABS. MONOCYTES 0.9 0.0 - 1.0 K/UL  
 ABS. EOSINOPHILS 0.1 0.0 - 0.4 K/UL  
 ABS. BASOPHILS 0.0 0.0 - 0.1 K/UL  
 ABS. IMM. GRANS. 0.1 (H) 0.00 - 0.04 K/UL  
 DF AUTOMATED    
 RBC COMMENTS NORMOCYTIC, NORMOCHROMIC METABOLIC PANEL, BASIC Collection Time: 12/28/18  3:47 PM  
Result Value Ref Range Sodium 135 (L) 136 - 145 mmol/L Potassium 4.4 3.5 - 5.1 mmol/L Chloride 102 97 - 108 mmol/L  
 CO2 27 21 - 32 mmol/L Anion gap 6 5 - 15 mmol/L Glucose 93 65 - 100 mg/dL BUN 14 6 - 20 MG/DL Creatinine 0.81 0.55 - 1.02 MG/DL  
 BUN/Creatinine ratio 17 12 - 20 GFR est AA >60 >60 ml/min/1.73m2 GFR est non-AA >60 >60 ml/min/1.73m2 Calcium 8.8 8.5 - 10.1 MG/DL MAGNESIUM Collection Time: 12/28/18  3:47 PM  
Result Value Ref Range Magnesium 1.9 1.6 - 2.4 mg/dL INFLUENZA A & B AG (RAPID TEST) Collection Time: 12/28/18  3:47 PM  
Result Value Ref Range Influenza A Antigen NEGATIVE  NEG Influenza B Antigen NEGATIVE  NEG Radiologic Studies -  
XR CHEST PA LAT Final Result IMPRESSION: No airspace disease or other acute abnormality. CT Results  (Last 48 hours) None CXR Results  (Last 48 hours) None Medical Decision Making I am the first provider for this patient. I reviewed the vital signs, available nursing notes, past medical history, past surgical history, family history and social history. Vital Signs-Reviewed the patient's vital signs. Patient Vitals for the past 12 hrs: 
 Temp Pulse Resp BP SpO2  
12/28/18 1542  100 19 125/84 97 % 12/28/18 1527 (!) 101.3 °F (38.5 °C) (!) 107 19 120/88 99 % Pulse Oximetry Analysis - 99% on RA Cardiac Monitor:  
Rate: 102 bpm 
Rhythm: Normal Sinus Rhythm Raman Vital Records Reviewed: Nursing Notes, Old Medical Records and Previous Laboratory Studies Provider Notes (Medical Decision Making):  
Patient presents with flu-like symptoms including upper respiratory symptoms, myalgias, fever. DDx: URI, PNA, sinusitis, electrolyte abnormalities. Will get labs and flu screen. Plan to also obtain chest xray given history of asthma and mild wheezing on exam that may mask or make her mor susceptible to underlying bacterial PNA. ED Course:  
Initial assessment performed. The patients presenting problems have been discussed, and they are in agreement with the care plan formulated and outlined with them. I have encouraged them to ask questions as they arise throughout their visit. 5:15 PM 
Labwork unremarkable. Xray negative for evidence of PNA. Patient is flu negative so is likely experiencing viral URI symptoms. Discussed good hydration and return precautions if symptoms worsen. 9:32 PM 
I have personally seen and examined the patient, reviewed the Resident's note and agree with findings and plan. Tim Sr MD 
  
The patient presents with:  Flu-like symptoms My exam shows: no meningismus, lungs clear ,no septic appearing Impression/Plan:  Suspect influenza will treat with Tamiflu I have reviewed and agree with the Resident's findings, including all diagnostic interpretations, and plans as written.  I was present during the key portions of separately billed procedures. Palmer Marshall MD 
 
 
 
 
Critical Care Time:  
None Disposition: 
Home PLAN: 
1.  DC home with follow-up at PCP as needed Current Discharge Medication List  
  
START taking these medications Details  
oseltamivir (TAMIFLU) 75 mg capsule Take 1 Cap by mouth daily for 5 days. Qty: 5 Cap, Refills: 0  
  
  
 
2. Follow-up Information Follow up With Specialties Details Why Contact Info Jessica Lee MD Family Practice In 3 days As needed, If symptoms worsen [address]  
[id number] 
[phone] [facility] EMERGENCY DEPT Emergency Medicine  If symptoms worsen [address] 
[phone] Return to ED if worse Diagnosis Clinical Impression: 1. Viral upper respiratory illness Attestations:

## 2019-03-02 ENCOUNTER — OFFICE VISIT (OUTPATIENT)
Dept: URGENT CARE | Age: 54
End: 2019-03-02

## 2019-03-02 VITALS
BODY MASS INDEX: 25.49 KG/M2 | HEART RATE: 106 BPM | TEMPERATURE: 98.4 F | DIASTOLIC BLOOD PRESSURE: 76 MMHG | HEIGHT: 65 IN | OXYGEN SATURATION: 97 % | WEIGHT: 153 LBS | SYSTOLIC BLOOD PRESSURE: 122 MMHG | RESPIRATION RATE: 16 BRPM

## 2019-03-02 DIAGNOSIS — R68.83 CHILLS: Primary | ICD-10-CM

## 2019-03-02 DIAGNOSIS — R05.9 COUGH: ICD-10-CM

## 2019-03-02 DIAGNOSIS — J11.00 INFLUENZA AND PNEUMONIA: ICD-10-CM

## 2019-03-02 LAB
FLUAV+FLUBV AG NOSE QL IA.RAPID: NEGATIVE POS/NEG
FLUAV+FLUBV AG NOSE QL IA.RAPID: POSITIVE POS/NEG
VALID INTERNAL CONTROL?: YES

## 2019-03-02 RX ORDER — AMOXICILLIN 500 MG/1
1000 CAPSULE ORAL 3 TIMES DAILY
Qty: 42 CAP | Refills: 0 | Status: SHIPPED | OUTPATIENT
Start: 2019-03-02 | End: 2019-03-02 | Stop reason: CLARIF

## 2019-03-02 RX ORDER — OSELTAMIVIR PHOSPHATE 75 MG/1
75 CAPSULE ORAL 2 TIMES DAILY
Qty: 10 CAP | Refills: 0 | Status: SHIPPED | OUTPATIENT
Start: 2019-03-02 | End: 2019-03-07

## 2019-03-02 RX ORDER — AZITHROMYCIN 250 MG/1
TABLET, FILM COATED ORAL
Qty: 6 TAB | Refills: 0 | Status: SHIPPED | OUTPATIENT
Start: 2019-03-02 | End: 2019-03-02 | Stop reason: CLARIF

## 2019-03-02 RX ORDER — CODEINE PHOSPHATE AND GUAIFENESIN 10; 100 MG/5ML; MG/5ML
5 SOLUTION ORAL
Qty: 118 ML | Refills: 0 | Status: SHIPPED | OUTPATIENT
Start: 2019-03-02 | End: 2019-03-07

## 2019-03-02 RX ORDER — BENZONATATE 100 MG/1
100 CAPSULE ORAL
Qty: 30 CAP | Refills: 0 | Status: SHIPPED | OUTPATIENT
Start: 2019-03-02 | End: 2019-03-09

## 2019-03-02 RX ORDER — LEVOFLOXACIN 750 MG/1
750 TABLET ORAL DAILY
Qty: 5 TAB | Refills: 0 | Status: SHIPPED | OUTPATIENT
Start: 2019-03-02 | End: 2019-03-07

## 2019-03-02 NOTE — LETTER
NOTIFICATION RETURN TO WORK / SCHOOL 
 
3/2/2019 6:42 PM 
 
Ms. Raj Cruz [address] [address] To Whom It May Concern: 
 
Raj Cruz is currently under the care of [address]. She will return to work/school on: 3/5/19 If there are questions or concerns please have the patient contact our office. Sincerely, Laura Silva

## 2019-03-02 NOTE — PATIENT INSTRUCTIONS
Rest and seek medical care for increased problems, any questions or concern including but not limited to the ones discussed with you here today. Drink plenty of fluids, take zinc cold remedy 2-3 times a day, use saline nasal spray, muccinex as directed and,  honey and lemon tea. Influenza (Flu): Care Instructions  Your Care Instructions    Influenza (flu) is an infection in the lungs and breathing passages. It is caused by the influenza virus. There are different strains, or types, of the flu virus from year to year. Unlike the common cold, the flu comes on suddenly and the symptoms, such as a cough, congestion, fever, chills, fatigue, aches, and pains, are more severe. These symptoms may last up to 10 days. Although the flu can make you feel very sick, it usually doesn't cause serious health problems. Home treatment is usually all you need for flu symptoms. But your doctor may prescribe antiviral medicine to prevent other health problems, such as pneumonia, from developing. Older people and those who have a long-term health condition, such as lung disease, are most at risk for having pneumonia or other health problems. Follow-up care is a key part of your treatment and safety. Be sure to make and go to all appointments, and call your doctor if you are having problems. It's also a good idea to know your test results and keep a list of the medicines you take. How can you care for yourself at home? · Get plenty of rest.  · Drink plenty of fluids, enough so that your urine is light yellow or clear like water. If you have kidney, heart, or liver disease and have to limit fluids, talk with your doctor before you increase the amount of fluids you drink. · Take an over-the-counter pain medicine if needed, such as acetaminophen (Tylenol), ibuprofen (Advil, Motrin), or naproxen (Aleve), to relieve fever, headache, and muscle aches. Read and follow all instructions on the label.  No one younger than 20 should take aspirin. It has been linked to Reye syndrome, a serious illness. · Do not smoke. Smoking can make the flu worse. If you need help quitting, talk to your doctor about stop-smoking programs and medicines. These can increase your chances of quitting for good. · Breathe moist air from a hot shower or from a sink filled with hot water to help clear a stuffy nose. · Before you use cough and cold medicines, check the label. These medicines may not be safe for young children or for people with certain health problems. · If the skin around your nose and lips becomes sore, put some petroleum jelly on the area. · To ease coughing:  ? Drink fluids to soothe a scratchy throat. ? Suck on cough drops or plain hard candy. ? Take an over-the-counter cough medicine that contains dextromethorphan to help you get some sleep. Read and follow all instructions on the label. ? Raise your head at night with an extra pillow. This may help you rest if coughing keeps you awake. · Take any prescribed medicine exactly as directed. Call your doctor if you think you are having a problem with your medicine. To avoid spreading the flu  · Wash your hands regularly, and keep your hands away from your face. · Stay home from school, work, and other public places until you are feeling better and your fever has been gone for at least 24 hours. The fever needs to have gone away on its own without the help of medicine. · Ask people living with you to talk to their doctors about preventing the flu. They may get antiviral medicine to keep from getting the flu from you. · To prevent the flu in the future, get a flu vaccine every fall. Encourage people living with you to get the vaccine. · Cover your mouth when you cough or sneeze. When should you call for help? Call 911 anytime you think you may need emergency care.  For example, call if:    · You have severe trouble breathing.    Call your doctor now or seek immediate medical care if:    · You have new or worse trouble breathing.     · You seem to be getting much sicker.     · You feel very sleepy or confused.     · You have a new or higher fever.     · You get a new rash.    Watch closely for changes in your health, and be sure to contact your doctor if:    · You begin to get better and then get worse.     · You are not getting better after 1 week. Where can you learn more? Go to http://davin-mando.info/. Enter E093 in the search box to learn more about \"Influenza (Flu): Care Instructions. \"  Current as of: September 5, 2018  Content Version: 11.9  © 0490-9845 [employer]. Care instructions adapted under license by [employer] (which disclaims liability or warranty for this information). If you have questions about a medical condition or this instruction, always ask your healthcare professional. [address] any warranty or liability for your use of this information. Pneumonia: Care Instructions  Your Care Instructions    Pneumonia is an infection of the lungs. Most cases are caused by infections from bacteria or viruses. Pneumonia may be mild or very severe. If it is caused by bacteria, you will be treated with antibiotics. It may take a few weeks to a few months to recover fully from pneumonia, depending on how sick you were and whether your overall health is good. Follow-up care is a key part of your treatment and safety. Be sure to make and go to all appointments, and call your doctor if you are having problems. It's also a good idea to know your test results and keep a list of the medicines you take. How can you care for yourself at home? · Take your antibiotics exactly as directed. Do not stop taking the medicine just because you are feeling better. You need to take the full course of antibiotics. · Take your medicines exactly as prescribed.  Call your doctor if you think you are having a problem with your medicine. · Get plenty of rest and sleep. You may feel weak and tired for a while, but your energy level will improve with time. · To prevent dehydration, drink plenty of fluids, enough so that your urine is light yellow or clear like water. Choose water and other caffeine-free clear liquids until you feel better. If you have kidney, heart, or liver disease and have to limit fluids, talk with your doctor before you increase the amount of fluids you drink. · Take care of your cough so you can rest. A cough that brings up mucus from your lungs is common with pneumonia. It is one way your body gets rid of the infection. But if coughing keeps you from resting or causes severe fatigue and chest-wall pain, talk to your doctor. He or she may suggest that you take a medicine to reduce the cough. · Use a vaporizer or humidifier to add moisture to your bedroom. Follow the directions for cleaning the machine. · Do not smoke or allow others to smoke around you. Smoke will make your cough last longer. If you need help quitting, talk to your doctor about stop-smoking programs and medicines. These can increase your chances of quitting for good. · Take an over-the-counter pain medicine, such as acetaminophen (Tylenol), ibuprofen (Advil, Motrin), or naproxen (Aleve). Read and follow all instructions on the label. · Do not take two or more pain medicines at the same time unless the doctor told you to. Many pain medicines have acetaminophen, which is Tylenol. Too much acetaminophen (Tylenol) can be harmful. · If you were given a spirometer to measure how well your lungs are working, use it as instructed. This can help your doctor tell how your recovery is going. · To prevent pneumonia in the future, talk to your doctor about getting a flu vaccine (once a year) and a pneumococcal vaccine (one time only for most people). When should you call for help? Call 911 anytime you think you may need emergency care.  For example, call if:    · You have severe trouble breathing.    Call your doctor now or seek immediate medical care if:    · You cough up dark brown or bloody mucus (sputum).     · You have new or worse trouble breathing.     · You are dizzy or lightheaded, or you feel like you may faint.    Watch closely for changes in your health, and be sure to contact your doctor if:    · You have a new or higher fever.     · You are coughing more deeply or more often.     · You are not getting better after 2 days (48 hours).     · You do not get better as expected. Where can you learn more? Go to http://davin-mando.info/. Enter [id number] in the search box to learn more about \"Pneumonia: Care Instructions. \"  Current as of: September 5, 2018  Content Version: 11.9  © 5968-8620 [employer]. Care instructions adapted under license by [employer] (which disclaims liability or warranty for this information). If you have questions about a medical condition or this instruction, always ask your healthcare professional. [address] any warranty or liability for your use of this information.

## 2019-03-03 NOTE — PROGRESS NOTES
Cough cold fatigue and fever with congestion since Monday and cough and pain with cough increasing       The history is provided by the patient. Cold Symptoms   The history is provided by the patient. This is a new problem. The current episode started more than 2 days ago. The problem occurs constantly. The problem has been gradually worsening. The cough is productive of sputum. Patient reports a subjective fever - was not measured. Associated symptoms include chest pain, chills, headaches (generalized HA earlier, better now), rhinorrhea and myalgias. Pertinent negatives include no sore throat, no shortness of breath, no wheezing, no nausea, no vomiting and no confusion. Treatments tried: OTC med. The treatment provided no relief. Past Medical History:   Diagnosis Date    Asthma     Hyperthyroidism         Past Surgical History:   Procedure Laterality Date    BREAST SURGERY PROCEDURE UNLISTED      cyst left breast    HX HYSTERECTOMY      still has ovaries    HX POLYPECTOMY      removed from nose         History reviewed. No pertinent family history. Social History     Socioeconomic History    Marital status: SINGLE     Spouse name: Not on file    Number of children: Not on file    Years of education: Not on file    Highest education level: Not on file   Social Needs    Financial resource strain: Not on file    Food insecurity - worry: Not on file    Food insecurity - inability: Not on file    Transportation needs - medical: Not on file   Varolii needs - non-medical: Not on file   Occupational History    Not on file   Tobacco Use    Smoking status: Never Smoker    Smokeless tobacco: Never Used   Substance and Sexual Activity    Alcohol use: No    Drug use: No    Sexual activity: Not on file   Other Topics Concern    Not on file   Social History Narrative    Not on file                ALLERGIES: Latex; Other food; Advil [ibuprofen];  Amoxil [amoxicillin]; Sulfa (sulfonamide antibiotics); Sweet potato; and Wheat    Review of Systems   Constitutional: Positive for activity change, chills, fatigue and fever. HENT: Positive for congestion, postnasal drip and rhinorrhea. Negative for sinus pressure, sinus pain and sore throat. Respiratory: Positive for cough. Negative for chest tightness, shortness of breath and wheezing. Cardiovascular: Positive for chest pain. Gastrointestinal: Negative. Negative for nausea and vomiting. Musculoskeletal: Positive for arthralgias and myalgias. Skin: Negative. Neurological: Positive for headaches (generalized HA earlier, better now). Negative for weakness and numbness. Psychiatric/Behavioral: Negative for confusion. Vitals:    19 1743   BP: 122/76   Pulse: (!) 106   Resp: 16   Temp: 98.4 °F (36.9 °C)   SpO2: 97%   Weight: 153 lb (69.4 kg)   Height: 5' 5\" (1.651 m)       Physical Exam   Constitutional: She is oriented to person, place, and time. She appears well-developed and well-nourished. She appears distressed (uncomfortable and coughing, tired apperaing but non toxic, ambulatory, cooperative). HENT:   Head: Normocephalic and atraumatic. Nasal congestion, No sinus pain, PND, BL fluid behind the TM's without erythema     Eyes: Conjunctivae and EOM are normal.   Neck: Normal range of motion. Neck supple. No thyromegaly present. Cardiovascular: Regular rhythm, normal heart sounds and intact distal pulses. Tachy at 100   Pulmonary/Chest: Effort normal. No stridor. She has no wheezes. She has no rales. She exhibits no tenderness. Frequent dry cough with  breath sounds BL bases. Pt speaking in full sentences and without labored breathing, rales rt base   Musculoskeletal: Normal range of motion. She exhibits no edema or tenderness. Lymphadenopathy:     She has no cervical adenopathy. Neurological: She is alert and oriented to person, place, and time. Skin: Skin is warm and dry. No rash noted.  She is not diaphoretic. Psychiatric: She has a normal mood and affect. Her behavior is normal. Thought content normal.   Nursing note and vitals reviewed. MDM    ICD-10-CM ICD-9-CM    1. Chills R68.83 780.64 AMB POC MAYELA INFLUENZA A/B TEST   2. Cough R05 786.2 XR CHEST PA LAT   3. Influenza and pneumonia J11.00 487.0 guaiFENesin-codeine (ROBITUSSIN AC) 100-10 mg/5 mL solution     Medications Ordered Today   Medications    DISCONTD: azithromycin (ZITHROMAX) 250 mg tablet     Sig: Take two tablets today then one tablet daily     Dispense:  6 Tab     Refill:  0    DISCONTD: amoxicillin (AMOXIL) 500 mg capsule     Sig: Take 2 Caps by mouth three (3) times daily for 7 days. Dispense:  42 Cap     Refill:  0    benzonatate (TESSALON PERLES) 100 mg capsule     Sig: Take 1 Cap by mouth three (3) times daily as needed for Cough for up to 7 days. Dispense:  30 Cap     Refill:  0    guaiFENesin-codeine (ROBITUSSIN AC) 100-10 mg/5 mL solution     Sig: Take 5 mL by mouth three (3) times daily as needed for Cough for up to 5 days. Max Daily Amount: 15 mL. Dispense:  118 mL     Refill:  0    oseltamivir (TAMIFLU) 75 mg capsule     Sig: Take 1 Cap by mouth two (2) times a day for 5 days. Dispense:  10 Cap     Refill:  0    levoFLOXacin (LEVAQUIN) 750 mg tablet     Sig: Take 1 Tab by mouth daily for 5 days. Dispense:  5 Tab     Refill:  0     The patients condition was discussed with the patient and they understand. The patient is to follow up with primary care doctor ,If signs and symptoms become worse the pt is to go to the ER. The patient is to take medications as prescribed. Pros and cons of levaquin discussed with patient. Initially, I prescribed z pack and Amox but pt stated at DC that she is allergic to Amox, thus, Rx was changed.  She is to rest and get close fu is worse or no better        Procedures

## 2019-07-15 ENCOUNTER — APPOINTMENT (OUTPATIENT)
Dept: GENERAL RADIOLOGY | Age: 54
End: 2019-07-15
Attending: PHYSICIAN ASSISTANT
Payer: MEDICAID

## 2019-07-15 ENCOUNTER — HOSPITAL ENCOUNTER (EMERGENCY)
Age: 54
Discharge: HOME | End: 2019-07-15
Attending: EMERGENCY MEDICINE
Payer: MEDICAID

## 2019-07-15 VITALS
HEIGHT: 65 IN | BODY MASS INDEX: 25.31 KG/M2 | HEART RATE: 85 BPM | DIASTOLIC BLOOD PRESSURE: 75 MMHG | SYSTOLIC BLOOD PRESSURE: 109 MMHG | RESPIRATION RATE: 24 BRPM | TEMPERATURE: 99.1 F | OXYGEN SATURATION: 100 % | WEIGHT: 151.9 LBS

## 2019-07-15 DIAGNOSIS — J45.21 MILD INTERMITTENT ASTHMA WITH ACUTE EXACERBATION: Primary | ICD-10-CM

## 2019-07-15 PROCEDURE — 74011636637 HC RX REV CODE- 636/637: Performed by: PHYSICIAN ASSISTANT

## 2019-07-15 PROCEDURE — 71046 X-RAY EXAM CHEST 2 VIEWS: CPT

## 2019-07-15 PROCEDURE — 77030029684 HC NEB SM VOL KT MONA -A

## 2019-07-15 PROCEDURE — 94640 AIRWAY INHALATION TREATMENT: CPT

## 2019-07-15 PROCEDURE — 99283 EMERGENCY DEPT VISIT LOW MDM: CPT

## 2019-07-15 PROCEDURE — 74011000250 HC RX REV CODE- 250: Performed by: PHYSICIAN ASSISTANT

## 2019-07-15 RX ORDER — IPRATROPIUM BROMIDE AND ALBUTEROL SULFATE 2.5; .5 MG/3ML; MG/3ML
3 SOLUTION RESPIRATORY (INHALATION)
Status: COMPLETED | OUTPATIENT
Start: 2019-07-15 | End: 2019-07-15

## 2019-07-15 RX ORDER — PREDNISONE 20 MG/1
60 TABLET ORAL
Status: COMPLETED | OUTPATIENT
Start: 2019-07-15 | End: 2019-07-15

## 2019-07-15 RX ORDER — CETIRIZINE HCL 10 MG
10 TABLET ORAL DAILY
Qty: 20 TAB | Refills: 0 | Status: SHIPPED | OUTPATIENT
Start: 2019-07-15

## 2019-07-15 RX ORDER — ALBUTEROL SULFATE 90 UG/1
2 AEROSOL, METERED RESPIRATORY (INHALATION)
Qty: 1 INHALER | Refills: 1 | OUTPATIENT
Start: 2019-07-15 | End: 2020-01-03

## 2019-07-15 RX ORDER — PREDNISONE 10 MG/1
TABLET ORAL
Qty: 21 TAB | Refills: 0 | Status: SHIPPED | OUTPATIENT
Start: 2019-07-15 | End: 2020-01-03

## 2019-07-15 RX ADMIN — IPRATROPIUM BROMIDE AND ALBUTEROL SULFATE 3 ML: .5; 3 SOLUTION RESPIRATORY (INHALATION) at 09:39

## 2019-07-15 RX ADMIN — PREDNISONE 60 MG: 20 TABLET ORAL at 09:39

## 2019-07-15 NOTE — ED NOTES
Clearance Comings PA reviewed discharge instructions with the patient. The patient verbalized understanding. All questions and concerns were addressed. The patient declined a wheelchair and is discharged ambulatory in the care of family members with instructions and prescriptions in hand. Pt is alert and oriented x 4. Respirations are clear and unlabored.

## 2019-07-15 NOTE — ED PROVIDER NOTES
EMERGENCY DEPARTMENT HISTORY AND PHYSICAL EXAM      Date: 7/15/2019  Patient Name: Shadi Barragan    History of Presenting Illness     Chief Complaint   Patient presents with    Cough     Ambulatory c/o cough/wheezing x 6 days Reports using inhaler without relief       History Provided By: Patient    HPI: Shadi Barragan, 47 y.o. female presents ambulatory to the ED with cc of 6 days of moderately severe, essentially constant wheezing and cough that is no better after using several puffs of her handheld albuterol. She does have a history of mild intermittent asthma for which she occasionally uses a hand-held albuterol/MDI. Does not have a nebulizer machine she tells me where she was working about a week ago was a very yuriy environment and she believes that is what caused her symptoms. There has been no fever. There are no other complaints, changes, or physical findings at this time. PCP: Camille Francis MD    Current Outpatient Medications   Medication Sig Dispense Refill    albuterol (PROVENTIL HFA) 90 mcg/actuation inhaler Take 2 Puffs by inhalation every four (4) hours as needed for Wheezing. 1 Inhaler 1    cetirizine (ZYRTEC) 10 mg tablet Take 1 Tab by mouth daily. 20 Tab 0    predniSONE (STERAPRED DS) 10 mg dose pack Per Dose Pack instructions 21 Tab 0    ergocalciferol (VITAMIN D2) 50,000 unit capsule Take 50,000 Units by mouth. Patient takes on Thursday's      albuterol (VENTOLIN HFA) 90 mcg/actuation inhaler Take 2 puffs by inhalation every four (4) hours as needed for Wheezing. Past History     Past Medical History:  Past Medical History:   Diagnosis Date    Asthma     Hyperthyroidism        Past Surgical History:  Past Surgical History:   Procedure Laterality Date    BREAST SURGERY PROCEDURE UNLISTED      cyst left breast    HX HYSTERECTOMY      still has ovaries    HX POLYPECTOMY      removed from nose       Family History:  No family history on file.     Social History:  Social History     Tobacco Use    Smoking status: Never Smoker    Smokeless tobacco: Never Used   Substance Use Topics    Alcohol use: No    Drug use: No       Allergies: Allergies   Allergen Reactions    Latex Rash    Other Food Hives     Rye    Advil [Ibuprofen] Anaphylaxis    Amoxil [Amoxicillin] Unknown (comments)    Sulfa (Sulfonamide Antibiotics) Rash    Sweet Potato Hives    Wheat Hives     Review of Systems   Review of Systems   Constitutional: Negative for fatigue and fever. HENT: Negative for ear pain and sore throat. Eyes: Negative for pain, redness and visual disturbance. Respiratory: Positive for cough and wheezing. Negative for shortness of breath. Cardiovascular: Negative for chest pain and palpitations. Gastrointestinal: Negative for abdominal pain, nausea and vomiting. Genitourinary: Negative for dysuria, frequency and urgency. Musculoskeletal: Negative for back pain, gait problem, neck pain and neck stiffness. Skin: Negative for rash and wound. Neurological: Negative for dizziness, weakness, light-headedness, numbness and headaches. Physical Exam   Physical Exam   Constitutional: She is oriented to person, place, and time. She appears well-developed and well-nourished. Non-toxic appearance. No distress. Pleasant; articulate; speaking in full sentences; no distress   HENT:   Head: Normocephalic and atraumatic. Right Ear: External ear normal.   Left Ear: External ear normal.   Nose: Nose normal.   Mouth/Throat: Uvula is midline. No trismus in the jaw. Eyes: Pupils are equal, round, and reactive to light. Conjunctivae and EOM are normal. No scleral icterus. Neck: Normal range of motion and full passive range of motion without pain. Cardiovascular: Normal rate and regular rhythm. Pulmonary/Chest: Effort normal. No accessory muscle usage. No tachypnea. No respiratory distress. She has no decreased breath sounds. She has wheezes (Diffuse expiratory wheezes). Abdominal: Soft. There is no tenderness. Musculoskeletal: Normal range of motion. Neurological: She is alert and oriented to person, place, and time. She is not disoriented. No cranial nerve deficit. GCS eye subscore is 4. GCS verbal subscore is 5. GCS motor subscore is 6. Skin: Skin is intact. No rash noted. Psychiatric: She has a normal mood and affect. Her speech is normal.   Nursing note and vitals reviewed. Diagnostic Study Results     Labs -   No results found for this or any previous visit (from the past 12 hour(s)). Radiologic Studies -   XR CHEST PA LAT   Final Result   Impression: No acute process. CT Results  (Last 48 hours)    None        CXR Results  (Last 48 hours)               07/15/19 0913  XR CHEST PA LAT Final result    Impression:  Impression: No acute process. Narrative:  Exam:  2 view chest       Indication: Cough. COMPARISON: 3-2-19       PA and lateral views demonstrate normal heart size. There is no acute process in   the lung fields. The osseous structures are unremarkable. Medical Decision Making   I am the first provider for this patient. I reviewed the vital signs, available nursing notes, past medical history, past surgical history, family history and social history. Vital Signs-Reviewed the patient's vital signs.   Patient Vitals for the past 12 hrs:   Temp Pulse Resp BP SpO2   07/15/19 0900 99.1 °F (37.3 °C) 85 24 109/75 100 %       Pulse Oximetry Analysis - 100% on room air    Records Reviewed: Nursing Notes, Old Medical Records, Previous Radiology Studies and Previous Laboratory Studies    Provider Notes (Medical Decision Making):   DDx: Asthma exacerbation, pneumonia, viral URI    Afebrile; well-appearing; no respiratory distress; chest x-ray is negative for acute process; she has mild, diffuse wheezing on exam; will begin oral glucocorticoids, give DuoNeb and reassess    10:08 AM  Good subjective improvement; wheezing persists however air movement is much improved; second DuoNeb offered, patient declines in favor of using her albuterol MDI. Anticipate discharge    ED Course:   Initial assessment performed. The patients presenting problems have been discussed, and they are in agreement with the care plan formulated and outlined with them. I have encouraged them to ask questions as they arise throughout their visit. Disposition:  Discharge    PLAN:  1. Discharge Medication List as of 7/15/2019 10:05 AM      START taking these medications    Details   !! albuterol (PROVENTIL HFA) 90 mcg/actuation inhaler Take 2 Puffs by inhalation every four (4) hours as needed for Wheezing., Normal, Disp-1 Inhaler, R-1      cetirizine (ZYRTEC) 10 mg tablet Take 1 Tab by mouth daily. , Normal, Disp-20 Tab, R-0      predniSONE (STERAPRED DS) 10 mg dose pack Per Dose Pack instructions, Normal, Disp-21 Tab, R-0       !! - Potential duplicate medications found. Please discuss with provider. CONTINUE these medications which have NOT CHANGED    Details   ergocalciferol (VITAMIN D2) 50,000 unit capsule Take 50,000 Units by mouth. Patient takes on Thursday's, Historical Med      !! albuterol (VENTOLIN HFA) 90 mcg/actuation inhaler Take 2 puffs by inhalation every four (4) hours as needed for Wheezing., Historical Med       !! - Potential duplicate medications found. Please discuss with provider. 2.   Follow-up Information     Follow up With Specialties Details Why Contact Info    Carlos Ortiz MD Family Practice Schedule an appointment as soon as possible for a visit As needed [address] [place], [place] [id number] [phone]          Return to ED if worse     Diagnosis     Clinical Impression:   1.  Mild intermittent asthma with acute exacerbation

## 2020-01-03 ENCOUNTER — HOSPITAL ENCOUNTER (EMERGENCY)
Age: 55
Discharge: HOME | End: 2020-01-03
Attending: EMERGENCY MEDICINE
Payer: MEDICAID

## 2020-01-03 ENCOUNTER — APPOINTMENT (OUTPATIENT)
Dept: GENERAL RADIOLOGY | Age: 55
End: 2020-01-03
Attending: PHYSICIAN ASSISTANT
Payer: MEDICAID

## 2020-01-03 VITALS
TEMPERATURE: 98.7 F | BODY MASS INDEX: 24.31 KG/M2 | HEIGHT: 66 IN | DIASTOLIC BLOOD PRESSURE: 87 MMHG | OXYGEN SATURATION: 100 % | WEIGHT: 151.24 LBS | HEART RATE: 93 BPM | SYSTOLIC BLOOD PRESSURE: 109 MMHG | RESPIRATION RATE: 16 BRPM

## 2020-01-03 DIAGNOSIS — R06.2 WHEEZE: ICD-10-CM

## 2020-01-03 DIAGNOSIS — J20.9 ACUTE BRONCHITIS, UNSPECIFIED ORGANISM: ICD-10-CM

## 2020-01-03 DIAGNOSIS — J06.9 ACUTE UPPER RESPIRATORY INFECTION: Primary | ICD-10-CM

## 2020-01-03 PROCEDURE — 74011000250 HC RX REV CODE- 250: Performed by: PHYSICIAN ASSISTANT

## 2020-01-03 PROCEDURE — 77030029684 HC NEB SM VOL KT MONA -A

## 2020-01-03 PROCEDURE — 71046 X-RAY EXAM CHEST 2 VIEWS: CPT

## 2020-01-03 PROCEDURE — 94640 AIRWAY INHALATION TREATMENT: CPT

## 2020-01-03 PROCEDURE — 74011250637 HC RX REV CODE- 250/637: Performed by: PHYSICIAN ASSISTANT

## 2020-01-03 PROCEDURE — 99283 EMERGENCY DEPT VISIT LOW MDM: CPT

## 2020-01-03 RX ORDER — PREDNISONE 10 MG/1
TABLET ORAL
Qty: 21 TAB | Refills: 0 | OUTPATIENT
Start: 2020-01-03 | End: 2020-02-15

## 2020-01-03 RX ORDER — ALBUTEROL SULFATE 90 UG/1
2 AEROSOL, METERED RESPIRATORY (INHALATION)
Qty: 1 INHALER | Refills: 1 | Status: SHIPPED | OUTPATIENT
Start: 2020-01-03 | End: 2020-02-15

## 2020-01-03 RX ORDER — DEXAMETHASONE SODIUM PHOSPHATE 4 MG/ML
10 INJECTION, SOLUTION INTRA-ARTICULAR; INTRALESIONAL; INTRAMUSCULAR; INTRAVENOUS; SOFT TISSUE
Status: COMPLETED | OUTPATIENT
Start: 2020-01-03 | End: 2020-01-03

## 2020-01-03 RX ORDER — PREDNISONE 10 MG/1
TABLET ORAL
Qty: 21 TAB | Refills: 0 | Status: SHIPPED | OUTPATIENT
Start: 2020-01-03 | End: 2020-01-03

## 2020-01-03 RX ORDER — IPRATROPIUM BROMIDE AND ALBUTEROL SULFATE 2.5; .5 MG/3ML; MG/3ML
3 SOLUTION RESPIRATORY (INHALATION)
Status: COMPLETED | OUTPATIENT
Start: 2020-01-03 | End: 2020-01-03

## 2020-01-03 RX ORDER — ALBUTEROL SULFATE 90 UG/1
2 AEROSOL, METERED RESPIRATORY (INHALATION)
Qty: 1 INHALER | Refills: 1 | Status: SHIPPED | OUTPATIENT
Start: 2020-01-03 | End: 2020-01-03

## 2020-01-03 RX ADMIN — IPRATROPIUM BROMIDE AND ALBUTEROL SULFATE 3 ML: .5; 3 SOLUTION RESPIRATORY (INHALATION) at 10:29

## 2020-01-03 RX ADMIN — DEXAMETHASONE SODIUM PHOSPHATE 10 MG: 4 INJECTION, SOLUTION INTRAMUSCULAR; INTRAVENOUS at 10:50

## 2020-01-03 RX ADMIN — IPRATROPIUM BROMIDE AND ALBUTEROL SULFATE 3 ML: .5; 3 SOLUTION RESPIRATORY (INHALATION) at 11:09

## 2020-01-03 NOTE — ED NOTES
Reassessed pt after first duo-neb, pt still has expiratory wheezing in lower lobes. PA Angelito natarajan.

## 2020-01-03 NOTE — ED PROVIDER NOTES
EMERGENCY DEPARTMENT HISTORY AND PHYSICAL EXAM      Date: 1/3/2020  Patient Name: Campbell Fernandez    History of Presenting Illness     Chief Complaint   Patient presents with    Cough     Ambulatory into the ED with c/o cough x several days. History Provided By: Patient    HPI: Campbell Fernandez, 49 y.o. female presents to the ED with cc of cough and chest tightness since December 30, 2019. Patient reports a history of asthma and states she feels like she has an upper respiratory tract infection. Patient has tried Tylenol with no relief. Denies known exposure to sick contacts. Reports associated chest tightness, wheezing, and possible fever on monday. Patient denies chest pain, abdominal pain, nausea, vomiting, diarrhea, otalgia, and sore throat. She denies chance pregnancy, states she has had a hysterectomy. There are no other complaints, changes, or physical findings at this time. PCP: Nancy Lynn MD    No current facility-administered medications on file prior to encounter. Current Outpatient Medications on File Prior to Encounter   Medication Sig Dispense Refill    albuterol (PROVENTIL HFA) 90 mcg/actuation inhaler Take 2 Puffs by inhalation every four (4) hours as needed for Wheezing. 1 Inhaler 1    ergocalciferol (VITAMIN D2) 50,000 unit capsule Take 50,000 Units by mouth. Patient takes on Thursday's      albuterol (VENTOLIN HFA) 90 mcg/actuation inhaler Take 2 puffs by inhalation every four (4) hours as needed for Wheezing.  cetirizine (ZYRTEC) 10 mg tablet Take 1 Tab by mouth daily.  20 Tab 0    [DISCONTINUED] predniSONE (STERAPRED DS) 10 mg dose pack Per Dose Pack instructions 21 Tab 0       Past History     Past Medical History:  Past Medical History:   Diagnosis Date    Asthma     Hyperthyroidism        Past Surgical History:  Past Surgical History:   Procedure Laterality Date    BREAST SURGERY PROCEDURE UNLISTED      cyst left breast    HX HYSTERECTOMY      still has ovaries    HX POLYPECTOMY      removed from nose       Family History:  History reviewed. No pertinent family history. Social History:  Social History     Tobacco Use    Smoking status: Never Smoker    Smokeless tobacco: Never Used   Substance Use Topics    Alcohol use: No    Drug use: No       Allergies: Allergies   Allergen Reactions    Latex Rash    Other Food Hives     Rye    Advil [Ibuprofen] Anaphylaxis    Amoxil [Amoxicillin] Unknown (comments)    Sulfa (Sulfonamide Antibiotics) Rash    Sweet Potato Hives    Wheat Hives         Review of Systems   Review of Systems   Constitutional: Positive for fever. Negative for chills. HENT: Positive for rhinorrhea. Negative for sore throat. Eyes: Negative. Negative for visual disturbance. Respiratory: Positive for cough, chest tightness and wheezing. Negative for shortness of breath. Cardiovascular: Negative. Negative for chest pain and palpitations. Gastrointestinal: Negative. Negative for abdominal pain, constipation, diarrhea, nausea and vomiting. Genitourinary: Negative. Negative for dysuria and hematuria. Musculoskeletal: Negative. Negative for arthralgias and myalgias. Skin: Negative. Negative for rash. Allergic/Immunologic: Positive for food allergies. Negative for environmental allergies. Neurological: Negative. Negative for headaches. Psychiatric/Behavioral: Negative. Negative for suicidal ideas. All other systems reviewed and are negative. Physical Exam   Physical Exam  Vitals signs reviewed. Constitutional:       General: She is not in acute distress. Appearance: She is well-developed. She is not diaphoretic. Comments: Pt appears sickly but non-toxic, awake and alert in NAD. HENT:      Head: Normocephalic and atraumatic.       Right Ear: Tympanic membrane, ear canal and external ear normal.      Left Ear: Tympanic membrane, ear canal and external ear normal.      Nose: Congestion and rhinorrhea present. Mouth/Throat:      Pharynx: Uvula midline. Eyes:      General:         Right eye: No discharge. Left eye: No discharge. Conjunctiva/sclera: Conjunctivae normal.      Pupils: Pupils are equal, round, and reactive to light. Neck:      Musculoskeletal: Normal range of motion. Cardiovascular:      Rate and Rhythm: Normal rate. Heart sounds: Normal heart sounds. Pulmonary:      Effort: Pulmonary effort is normal. No respiratory distress. Breath sounds: Wheezing (diffuse inspiratory and expiratory) present. No rales. Comments: Speaking in complete sentences. Tight breath sounds. Chest:      Chest wall: No tenderness. Abdominal:      General: Bowel sounds are normal.      Palpations: Abdomen is soft. Tenderness: There is no tenderness. There is no guarding. Comments: No CVA tenderness b/l. Musculoskeletal: Normal range of motion. General: No tenderness. Skin:     General: Skin is warm and dry. Coloration: Skin is not pale. Findings: No erythema or rash. Neurological:      Mental Status: She is alert and oriented to person, place, and time. Coordination: Coordination normal.      Comments: No focal neuro deficits. Psychiatric:         Behavior: Behavior normal.         Diagnostic Study Results     Labs -   No results found for this or any previous visit (from the past 12 hour(s)). Radiologic Studies -   XR CHEST PA LAT    (Results Pending)     CT Results  (Last 48 hours)    None        CXR Results  (Last 48 hours)    None          Medical Decision Making   I am the first provider for this patient. I reviewed the vital signs, available nursing notes, past medical history, past surgical history, family history and social history. Vital Signs-Reviewed the patient's vital signs.   Patient Vitals for the past 12 hrs:   Temp Pulse Resp BP SpO2   01/03/20 0943 98.7 °F (37.1 °C) 93 16 109/87 100 % Provider Notes (Medical Decision Making):   URI, bronchitis, asthma exacerbation, PNA    ED Course:   Initial assessment performed. The patients presenting problems have been discussed, and they are in agreement with the care plan formulated and outlined with them. I have encouraged them to ask questions as they arise throughout their visit. 11:38 AM  Slight inspiratory wheeze to RUL. Patient states she is feeling better. Disposition:  11:39 AM    PLAN:  1. Discharge Medication List as of 1/3/2020 11:42 AM      START taking these medications    Details   predniSONE (STERAPRED DS) 10 mg dose pack Take as directed on dosage pack, Normal, Disp-21 Tab, R-0         CONTINUE these medications which have CHANGED    Details   albuterol (PROAIR HFA) 90 mcg/actuation inhaler Take 2 Puffs by inhalation every four (4) hours as needed for Wheezing., Normal, Disp-1 Inhaler, R-1         CONTINUE these medications which have NOT CHANGED    Details   ergocalciferol (VITAMIN D2) 50,000 unit capsule Take 50,000 Units by mouth. Patient takes on Thursday's, Historical Med      cetirizine (ZYRTEC) 10 mg tablet Take 1 Tab by mouth daily. , Normal, Disp-20 Tab, R-0           2. Follow-up Information     Follow up With Specialties Details Why Contact Info    Natalia Canela MD Family Practice Schedule an appointment as soon as possible for a visit in 2 days  [address]  [phone]      [facility] EMERGENCY DEPT Emergency Medicine  As needed or, If symptoms worsen [address] [place] [address] Drive  [address] [place] [address]  [phone]        Return to ED if worse     Diagnosis     Clinical Impression:   1. Acute upper respiratory infection    2. Acute bronchitis, unspecified organism    3. Wheeze        Attestations:    SHIRLEY Trujillo    Please note that this dictation was completed with [employer], the [employer] voice recognition software.   Quite often unanticipated grammatical, syntax, homophones, and other interpretive errors are inadvertently transcribed by the computer software. Please disregard these errors. Please excuse any errors that have escaped final proofreading. Thank you. 7:34 AM  I was personally available for consultation in the emergency department. I have reviewed the chart and agree with the documentation recorded by the RACH, including the assessment, treatment plan, and disposition. Tanja Dunn MD      This note will not be viewable in [address]

## 2020-01-03 NOTE — ED NOTES
GUI Eaton at bedside to provide discharge paperwork. Vital signs stable. Pt in no apparent distress at this time. Mental status at baseline. Ambulatory to waiting room with steady gate, discharge paperwork in hand. Patient and or family acknowledged and signed discharge instructions that were created/provided by the Physician. Signed discharge form with patient label placed in scan box.

## 2020-01-03 NOTE — ED NOTES
Re-assessed pt lung sounds after second duo-neb, minimal audible wheezing noted in R upper lobe. Pt states she feels better after breathing tx's.

## 2020-01-03 NOTE — LETTER
[facility] EM[facility] 
[address] [place] [address] Adrian Booth [id number] [phone] Work/School Note Date: 1/3/2020 To Whom It May concern: 
 
Gema Mcgee was seen and treated today in the emergency room by the following provider(s): 
Attending Provider: Kaushal Ponce MD 
Physician Assistant: SHIRLEY Moreno. Gema Mcgee may return to work in 1-2 days. Sincerely, Devi Rodrigues, [address]

## 2020-01-03 NOTE — DISCHARGE INSTRUCTIONS
Patient Education   1. Start Albuterol inhaler  2. Start prednisone pack tomorrow  3. Follow up with PCP, return to ED for any new or worsening symptoms     Bronchitis: Care Instructions  Your Care Instructions    Bronchitis is inflammation of the bronchial tubes, which carry air to the lungs. The tubes swell and produce mucus, or phlegm. The mucus and inflamed bronchial tubes make you cough. You may have trouble breathing. Most cases of bronchitis are caused by viruses like those that cause colds. Antibiotics usually do not help and they may be harmful. Bronchitis usually develops rapidly and lasts about 2 to 3 weeks in otherwise healthy people. Follow-up care is a key part of your treatment and safety. Be sure to make and go to all appointments, and call your doctor if you are having problems. It's also a good idea to know your test results and keep a list of the medicines you take. How can you care for yourself at home? · Take all medicines exactly as prescribed. Call your doctor if you think you are having a problem with your medicine. · Get some extra rest.  · Take an over-the-counter pain medicine, such as acetaminophen (Tylenol), ibuprofen (Advil, Motrin), or naproxen (Aleve) to reduce fever and relieve body aches. Read and follow all instructions on the label. · Do not take two or more pain medicines at the same time unless the doctor told you to. Many pain medicines have acetaminophen, which is Tylenol. Too much acetaminophen (Tylenol) can be harmful. · Take an over-the-counter cough medicine that contains dextromethorphan to help quiet a dry, hacking cough so that you can sleep. Avoid cough medicines that have more than one active ingredient. Read and follow all instructions on the label. · Breathe moist air from a humidifier, hot shower, or sink filled with hot water. The heat and moisture will thin mucus so you can cough it out. · Do not smoke. Smoking can make bronchitis worse.  If you need help quitting, talk to your doctor about stop-smoking programs and medicines. These can increase your chances of quitting for good. When should you call for help? Call 911 anytime you think you may need emergency care. For example, call if:    · You have severe trouble breathing.    Call your doctor now or seek immediate medical care if:    · You have new or worse trouble breathing.     · You cough up dark brown or bloody mucus (sputum).     · You have a new or higher fever.     · You have a new rash.    Watch closely for changes in your health, and be sure to contact your doctor if:    · You cough more deeply or more often, especially if you notice more mucus or a change in the color of your mucus.     · You are not getting better as expected. Where can you learn more? Go to http://davin-mando.info/. Enter H333 in the search box to learn more about \"Bronchitis: Care Instructions. \"  Current as of: June 9, 2019  Content Version: 12.2  © 8406-8485 [employer]. Care instructions adapted under license by [employer] (which disclaims liability or warranty for this information). If you have questions about a medical condition or this instruction, always ask your healthcare professional. [place] [address] any warranty or liability for your use of this information.

## 2020-01-03 NOTE — ED NOTES
Patient called and left message that she needed prescription changed to [facility] on [address]. Called patient. She states she was able to get the prescription changed on her own.    Estela Marin

## 2020-02-15 ENCOUNTER — HOSPITAL ENCOUNTER (EMERGENCY)
Age: 55
Discharge: HOME | End: 2020-02-15
Attending: EMERGENCY MEDICINE
Payer: MEDICAID

## 2020-02-15 VITALS
BODY MASS INDEX: 24.87 KG/M2 | TEMPERATURE: 98.1 F | WEIGHT: 154.76 LBS | DIASTOLIC BLOOD PRESSURE: 72 MMHG | SYSTOLIC BLOOD PRESSURE: 105 MMHG | RESPIRATION RATE: 20 BRPM | HEART RATE: 67 BPM | HEIGHT: 66 IN | OXYGEN SATURATION: 99 %

## 2020-02-15 DIAGNOSIS — J45.901 ASTHMA WITH ACUTE EXACERBATION, UNSPECIFIED ASTHMA SEVERITY, UNSPECIFIED WHETHER PERSISTENT: Primary | ICD-10-CM

## 2020-02-15 PROCEDURE — 77030029684 HC NEB SM VOL KT MONA -A

## 2020-02-15 PROCEDURE — 94640 AIRWAY INHALATION TREATMENT: CPT

## 2020-02-15 PROCEDURE — 74011000250 HC RX REV CODE- 250: Performed by: EMERGENCY MEDICINE

## 2020-02-15 PROCEDURE — 74011636637 HC RX REV CODE- 636/637: Performed by: EMERGENCY MEDICINE

## 2020-02-15 PROCEDURE — 77030012341 HC CHMB SPCR OPTC MDI VYRM -A

## 2020-02-15 PROCEDURE — 99283 EMERGENCY DEPT VISIT LOW MDM: CPT

## 2020-02-15 PROCEDURE — 74011000270 HC RX REV CODE- 270: Performed by: EMERGENCY MEDICINE

## 2020-02-15 RX ORDER — IPRATROPIUM BROMIDE AND ALBUTEROL SULFATE 2.5; .5 MG/3ML; MG/3ML
3 SOLUTION RESPIRATORY (INHALATION)
Status: COMPLETED | OUTPATIENT
Start: 2020-02-15 | End: 2020-02-15

## 2020-02-15 RX ORDER — NEBULIZER AND COMPRESSOR
1 EACH MISCELLANEOUS
Qty: 1 EACH | Refills: 0 | Status: SHIPPED | OUTPATIENT
Start: 2020-02-15

## 2020-02-15 RX ORDER — PREDNISONE 20 MG/1
40 TABLET ORAL DAILY
Qty: 10 TAB | Refills: 0 | Status: SHIPPED | OUTPATIENT
Start: 2020-02-15 | End: 2020-02-20

## 2020-02-15 RX ORDER — ALBUTEROL SULFATE 90 UG/1
2 AEROSOL, METERED RESPIRATORY (INHALATION)
Qty: 1 INHALER | Refills: 1 | OUTPATIENT
Start: 2020-02-15 | End: 2020-04-30

## 2020-02-15 RX ORDER — PREDNISONE 20 MG/1
60 TABLET ORAL
Status: COMPLETED | OUTPATIENT
Start: 2020-02-15 | End: 2020-02-15

## 2020-02-15 RX ORDER — ALBUTEROL SULFATE 0.83 MG/ML
2.5 SOLUTION RESPIRATORY (INHALATION)
Qty: 30 NEBULE | Refills: 0 | Status: SHIPPED | OUTPATIENT
Start: 2020-02-15

## 2020-02-15 RX ADMIN — PREDNISONE 60 MG: 20 TABLET ORAL at 08:51

## 2020-02-15 RX ADMIN — IPRATROPIUM BROMIDE AND ALBUTEROL SULFATE 3 ML: .5; 3 SOLUTION RESPIRATORY (INHALATION) at 08:54

## 2020-02-15 RX ADMIN — Medication: at 08:51

## 2020-02-15 RX ADMIN — IPRATROPIUM BROMIDE AND ALBUTEROL SULFATE 3 ML: .5; 3 SOLUTION RESPIRATORY (INHALATION) at 09:00

## 2020-02-15 NOTE — ED NOTES
Her lungs clear after nebs; some shakes she is feeling better  The spacer return demonstration instructed by / Whit Mccall

## 2020-02-15 NOTE — DISCHARGE INSTRUCTIONS
Patient Education        Asthma Attack: Care Instructions  Your Care Instructions    During an asthma attack, the airways swell and narrow. This makes it hard to breathe. Severe asthma attacks can be life-threatening, but you can help prevent them by keeping your asthma under control and treating symptoms before they get bad. Symptoms include being short of breath, having chest tightness, coughing, and wheezing. Noting and treating these symptoms can also help you avoid future trips to the emergency room. The doctor has checked you carefully, but problems can develop later. If you notice any problems or new symptoms, get medical treatment right away. Follow-up care is a key part of your treatment and safety. Be sure to make and go to all appointments, and call your doctor if you are having problems. It's also a good idea to know your test results and keep a list of the medicines you take. How can you care for yourself at home? · Follow your asthma action plan to prevent and treat attacks. If you don't have an asthma action plan, work with your doctor to create one. · Take your asthma medicines exactly as prescribed. Talk to your doctor right away if you have any questions about how to take them. ? Use your quick-relief medicine when you have symptoms of an attack. Quick-relief medicine is usually an albuterol inhaler. Some people need to use quick-relief medicine before they exercise. ? Take your controller medicine every day, not just when you have symptoms. Controller medicine is usually an inhaled corticosteroid. The goal is to prevent problems before they occur. Don't use your controller medicine to treat an attack that has already started. It doesn't work fast enough to help. ? If your doctor prescribed corticosteroid pills to use during an attack, take them exactly as prescribed. It may take hours for the pills to work, but they may make the episode shorter and help you breathe better. ?  Keep your quick-relief medicine with you at all times. · Talk to your doctor before using other medicines. Some medicines, such as aspirin, can cause asthma attacks in some people. · If you have a peak flow meter, use it to check how well you are breathing. This can help you predict when an asthma attack is going to occur. Then you can take medicine to prevent the asthma attack or make it less severe. · Do not smoke or allow others to smoke around you. Avoid smoky places. Smoking makes asthma worse. If you need help quitting, talk to your doctor about stop-smoking programs and medicines. These can increase your chances of quitting for good. · Learn what triggers an asthma attack for you, and avoid the triggers when you can. Common triggers include colds, smoke, air pollution, dust, pollen, mold, pets, cockroaches, stress, and cold air. · Avoid colds and the flu. Get a pneumococcal vaccine shot. If you have had one before, ask your doctor if you need a second dose. Get a flu vaccine every fall. If you must be around people with colds or the flu, wash your hands often. When should you call for help? Call 911 anytime you think you may need emergency care. For example, call if:    · You have severe trouble breathing.    Call your doctor now or seek immediate medical care if:    · Your symptoms do not get better after you have followed your asthma action plan.     · You have new or worse trouble breathing.     · Your coughing and wheezing get worse.     · You cough up dark brown or bloody mucus (sputum).     · You have a new or higher fever.    Watch closely for changes in your health, and be sure to contact your doctor if:    · You need to use quick-relief medicine on more than 2 days a week (unless it is just for exercise).     · You cough more deeply or more often, especially if you notice more mucus or a change in the color of your mucus.     · You are not getting better as expected. Where can you learn more?   Go to http://davin-mando.info/. Enter A656 in the search box to learn more about \"Asthma Attack: Care Instructions. \"  Current as of: June 9, 2019  Content Version: 12.2  © 0461-8818 [employer]. Care instructions adapted under license by [employer] (which disclaims liability or warranty for this information). If you have questions about a medical condition or this instruction, always ask your healthcare professional. [address] any warranty or liability for your use of this information. Patient Education        Learning About Asthma Triggers  What are asthma triggers? When you have asthma, certain things can make your symptoms worse. These are called triggers. Learn what triggers an asthma attack for you, and avoid the triggers when you can. Common triggers include colds, smoke, air pollution, dust, pollen, pets, stress, and cold air. How do asthma triggers affect you? Triggers can make it harder for your lungs to work as they should. They can lead to sudden breathing problems and other symptoms. When you are around a trigger, an asthma attack is more likely. If your symptoms are severe, you may need emergency treatment or have to go to the hospital for treatment. What can you do to avoid triggers? The first thing is to know your triggers. When you are having symptoms, note the things around you that might be causing them. Then look for patterns that may be triggering your symptoms. Record your triggers on a piece of paper or in an asthma diary. When you have your list of possible triggers, work with your doctor to find ways to avoid them. Avoid colds and flu. Get a pneumococcal vaccine shot. If you have had one before, ask your doctor whether you need a second dose. Get a flu vaccine every year, as soon as it's available. If you must be around people with colds or the flu, wash your hands often.   Here are some ways to avoid a few common triggers. · Do not smoke or allow others to smoke around you. If you need help quitting, talk to your doctor about stop-smoking programs and medicines. These can increase your chances of quitting for good. · If there is a lot of pollution, pollen, or dust outside, stay at home and keep your windows closed. Use an air conditioner or air filter in your home. Check your local weather report or newspaper for air quality and pollen reports. What else should you know? · Take your controller medicine every day, not just when you have symptoms. It helps prevent problems before they occur. · Your doctor may suggest that you check how well your lungs are working by measuring your peak expiratory flow (PEF) throughout the day. Your PEF may drop when you are near things that trigger symptoms. Where can you learn more? Go to http://davin-mando.info/. Enter K316 in the search box to learn more about \"Learning About Asthma Triggers. \"  Current as of: June 9, 2019  Content Version: 12.2  © 3199-8667 [employer]. Care instructions adapted under license by [employer] (which disclaims liability or warranty for this information). If you have questions about a medical condition or this instruction, always ask your healthcare professional. [place] [address] any warranty or liability for your use of this information.

## 2020-02-15 NOTE — ED PROVIDER NOTES
EMERGENCY DEPARTMENT HISTORY AND PHYSICAL EXAM      Date: 2/15/2020  Patient Name: Marcus Rosales    Please note that this dictation was completed with [employer], the computer voice recognition software. Quite often unanticipated grammatical, syntax, homophones, and other interpretive errors are inadvertently transcribed by the computer software. Please disregard these errors. Please excuse any errors that have escaped final proofreading. History of Presenting Illness     Chief Complaint   Patient presents with    Wheezing     ambualtory to triage, in no obvious distress. States that she has been experiencing an asthma exacerbation x1 week, symptoms are SOB and wheezing without fever or chest pain. Has been using her proair unhaler without relief    Shortness of Breath       History Provided By: Patient    HPI: Marcus Rosales, 49 y.o. female, with a past medical history significant for asthma, never been intubated, never been hospitalized presenting the emergency department complaining of wheezing that is been going on for the past few days. She does report wheezing, nonproductive cough. No chest pain. No fevers or chills. No unilateral leg pain or leg swelling. No prior history of DVT or PE. She was using her inhaler at home with minimal relief. No other exacerbating or relieving factors or associated symptoms at this time. PCP: Nayeli Reilly MD    No current facility-administered medications on file prior to encounter. Current Outpatient Medications on File Prior to Encounter   Medication Sig Dispense Refill    [DISCONTINUED] predniSONE (STERAPRED DS) 10 mg dose pack Take as directed on dosage pack 21 Tab 0    [DISCONTINUED] albuterol (PROAIR HFA) 90 mcg/actuation inhaler Take 2 Puffs by inhalation every four (4) hours as needed for Wheezing. 1 Inhaler 1    cetirizine (ZYRTEC) 10 mg tablet Take 1 Tab by mouth daily.  20 Tab 0    ergocalciferol (VITAMIN D2) 50,000 unit capsule Take 50,000 Units by mouth. Patient takes on Thursday's         Past History     Past Medical History:  Past Medical History:   Diagnosis Date    Asthma     Hyperthyroidism        Past Surgical History:  Past Surgical History:   Procedure Laterality Date    BREAST SURGERY PROCEDURE UNLISTED      cyst left breast    HX HYSTERECTOMY      still has ovaries    HX POLYPECTOMY      removed from nose       Family History:  No family history on file. Social History:  Social History     Tobacco Use    Smoking status: Never Smoker    Smokeless tobacco: Never Used   Substance Use Topics    Alcohol use: No    Drug use: No       Allergies: Allergies   Allergen Reactions    Latex Rash    Other Food Hives     Rye    Advil [Ibuprofen] Anaphylaxis    Amoxil [Amoxicillin] Unknown (comments)    Sulfa (Sulfonamide Antibiotics) Rash    Sweet Potato Hives    Wheat Hives         Review of Systems   Review of Systems   Constitutional: Negative for chills and fever. HENT: Negative for congestion and sore throat. Eyes: Negative for visual disturbance. Respiratory: Positive for shortness of breath and wheezing. Negative for cough. Cardiovascular: Negative for chest pain and leg swelling. Gastrointestinal: Negative for abdominal pain, blood in stool, diarrhea and nausea. Endocrine: Negative for polyuria. Genitourinary: Negative for dysuria, flank pain, vaginal bleeding and vaginal discharge. Musculoskeletal: Negative for myalgias. Skin: Negative for rash. Allergic/Immunologic: Negative for immunocompromised state. Neurological: Negative for weakness and headaches. Psychiatric/Behavioral: Negative for confusion. Physical Exam   Physical Exam  Vitals signs and nursing note reviewed. Constitutional:       Appearance: She is well-developed. HENT:      Head: Normocephalic and atraumatic. Eyes:      General:         Right eye: No discharge. Left eye: No discharge.       Conjunctiva/sclera: Conjunctivae normal.      Pupils: Pupils are equal, round, and reactive to light. Neck:      Musculoskeletal: Normal range of motion and neck supple. Trachea: No tracheal deviation. Cardiovascular:      Rate and Rhythm: Normal rate and regular rhythm. Heart sounds: Normal heart sounds. No murmur. Pulmonary:      Effort: Pulmonary effort is normal. No respiratory distress. Breath sounds: Normal breath sounds. No wheezing or rales. Abdominal:      General: Bowel sounds are normal.      Palpations: Abdomen is soft. Tenderness: There is no abdominal tenderness. There is no guarding or rebound. Musculoskeletal: Normal range of motion. General: No tenderness or deformity. Skin:     General: Skin is warm and dry. Findings: No erythema or rash. Neurological:      Mental Status: She is alert and oriented to person, place, and time. Psychiatric:         Behavior: Behavior normal.         Diagnostic Study Results     Labs -   No results found for this or any previous visit (from the past 12 hour(s)). Radiologic Studies -   No orders to display     CT Results  (Last 48 hours)    None        CXR Results  (Last 48 hours)    None            Medical Decision Making   I am the first provider for this patient. I reviewed the vital signs, available nursing notes, past medical history, past surgical history, family history and social history. Vital Signs-Reviewed the patient's vital signs.   Patient Vitals for the past 12 hrs:   Temp Pulse Resp BP SpO2   02/15/20 0900     100 %   02/15/20 0854     98 %   02/15/20 0830 98.1 °F (36.7 °C) 67 20 105/72 98 %       \    Records Reviewed: Nursing Notes and Old Medical Records    Provider Notes (Medical Decision Making):   Nontoxic well-appearing female, has wheezes bilaterally, but is not in any respiratory distress, she is saturating 100% on room air, no need for any imaging based off the history and physical exam, will treat for asthma exacerbation. The patient is in agreement with plan, will give a spacer chamber for her inhaler, we refill inhaler. Discharged home to follow-up with PCP. ED Course:   Initial assessment performed. The patients presenting problems have been discussed, and they are in agreement with the care plan formulated and outlined with them. I have encouraged them to ask questions as they arise throughout their visit. Critical Care Time:   none    Disposition:  DISCHARGE NOTE  Patients results have been reviewed with them. Patient and/or family have verbally conveyed their understanding and agreement of the patient's signs, symptoms, diagnosis, treatment and prognosis and additionally agree to follow up as recommended or return to the Emergency Room should their condition change or have any new concerns prior to their follow-up appointment. Patient verbally agrees with the care-plan and verbally conveys that all of their questions have been answered. Discharge instructions have also been provided to the patient with some educational information regarding their diagnosis as well a list of reasons why they would want to return to the ER prior to their follow-up appointment should their condition change. PLAN:  1. Current Discharge Medication List      START taking these medications    Details   predniSONE (DELTASONE) 20 mg tablet Take 40 mg by mouth daily for 5 days. With Breakfast  Qty: 10 Tab, Refills: 0      Nebulizer & Compressor machine 1 Each by Does Not Apply route every four (4) hours as needed for Wheezing or Shortness of Breath. Qty: 1 Each, Refills: 0      albuterol (PROVENTIL VENTOLIN) 2.5 mg /3 mL (0.083 %) nebu 3 mL by Nebulization route every four (4) hours as needed for Wheezing.   Qty: 30 Nebule, Refills: 0         CONTINUE these medications which have CHANGED    Details   albuterol (PROAIR HFA) 90 mcg/actuation inhaler Take 2 Puffs by inhalation every four (4) hours as needed for Wheezing. Qty: 1 Inhaler, Refills: 1         STOP taking these medications       predniSONE (STERAPRED DS) 10 mg dose pack Comments:   Reason for Stoppin.   Follow-up Information     Follow up With Specialties Details Why Contact Info    Marene Peabody, MD Family Practice Schedule an appointment as soon as possible for a visit  [address]  [phone]      [facility] EMERGENCY DEPT Emergency Medicine  If symptoms worsen [address]  [address] [facility]  [phone]          Return to ED if worse     Diagnosis     Clinical Impression:   1. Asthma with acute exacerbation, unspecified asthma severity, unspecified whether persistent        Attestations:   This note was completed by Elisabeth Alcantara DO

## 2020-04-30 ENCOUNTER — HOSPITAL ENCOUNTER (EMERGENCY)
Age: 55
Discharge: HOME | End: 2020-04-30
Attending: EMERGENCY MEDICINE
Payer: MEDICAID

## 2020-04-30 VITALS
DIASTOLIC BLOOD PRESSURE: 84 MMHG | BODY MASS INDEX: 25.45 KG/M2 | OXYGEN SATURATION: 97 % | SYSTOLIC BLOOD PRESSURE: 102 MMHG | TEMPERATURE: 98.3 F | RESPIRATION RATE: 20 BRPM | HEIGHT: 65 IN | WEIGHT: 152.78 LBS | HEART RATE: 80 BPM

## 2020-04-30 DIAGNOSIS — J45.41 MODERATE PERSISTENT ASTHMA WITH ACUTE EXACERBATION: Primary | ICD-10-CM

## 2020-04-30 PROCEDURE — 74011000250 HC RX REV CODE- 250: Performed by: EMERGENCY MEDICINE

## 2020-04-30 PROCEDURE — 74011636637 HC RX REV CODE- 636/637: Performed by: EMERGENCY MEDICINE

## 2020-04-30 PROCEDURE — 99284 EMERGENCY DEPT VISIT MOD MDM: CPT

## 2020-04-30 RX ORDER — IPRATROPIUM BROMIDE AND ALBUTEROL SULFATE 2.5; .5 MG/3ML; MG/3ML
3 SOLUTION RESPIRATORY (INHALATION)
Qty: 60 NEBULE | Refills: 0 | Status: SHIPPED | OUTPATIENT
Start: 2020-04-30

## 2020-04-30 RX ORDER — PREDNISONE 10 MG/1
TABLET ORAL
Qty: 48 TAB | Refills: 0 | Status: SHIPPED | OUTPATIENT
Start: 2020-04-30 | End: 2022-07-07 | Stop reason: ALTCHOICE

## 2020-04-30 RX ORDER — ALBUTEROL SULFATE 90 UG/1
2 AEROSOL, METERED RESPIRATORY (INHALATION)
Qty: 1 INHALER | Refills: 1 | Status: SHIPPED | OUTPATIENT
Start: 2020-04-30

## 2020-04-30 RX ORDER — PREDNISONE 20 MG/1
60 TABLET ORAL
Status: COMPLETED | OUTPATIENT
Start: 2020-04-30 | End: 2020-04-30

## 2020-04-30 RX ORDER — PREDNISONE 20 MG/1
TABLET ORAL
Status: DISCONTINUED
Start: 2020-04-30 | End: 2020-04-30 | Stop reason: HOSPADM

## 2020-04-30 RX ORDER — IPRATROPIUM BROMIDE AND ALBUTEROL SULFATE 2.5; .5 MG/3ML; MG/3ML
SOLUTION RESPIRATORY (INHALATION)
Status: DISCONTINUED
Start: 2020-04-30 | End: 2020-04-30 | Stop reason: HOSPADM

## 2020-04-30 RX ORDER — IPRATROPIUM BROMIDE AND ALBUTEROL SULFATE 2.5; .5 MG/3ML; MG/3ML
3 SOLUTION RESPIRATORY (INHALATION)
Status: COMPLETED | OUTPATIENT
Start: 2020-04-30 | End: 2020-04-30

## 2020-04-30 RX ADMIN — IPRATROPIUM BROMIDE AND ALBUTEROL SULFATE 3 ML: .5; 3 SOLUTION RESPIRATORY (INHALATION) at 01:39

## 2020-04-30 RX ADMIN — PREDNISONE 60 MG: 20 TABLET ORAL at 01:30

## 2020-04-30 NOTE — ED NOTES
Dr. Adriel Johnson reviewed discharge instructions with the patient. The patient verbalized understanding. All questions and concerns were addressed. The patient declined a wheelchair and is discharged ambulatory in the care of family members with instructions and prescriptions in hand. Pt is alert and oriented x 4. Respirations are clear and unlabored.

## 2020-04-30 NOTE — DISCHARGE INSTRUCTIONS
Patient Education        Learning About Asthma  What is asthma? Asthma is a long-term condition that affects your breathing. It causes the airways that lead to the lungs to swell. People with asthma may have asthma attacks. During an asthma attack, the airways tighten and become narrower. This makes it hard to breathe, and you may wheeze or cough. If you have a bad asthma attack, you may need emergency care. Asthma affects people in different ways. Some people only have asthma attacks during allergy season, or when they breathe in cold air, or when they exercise. Others have many bad attacks that send them to the doctor often. What are the symptoms of asthma? When you have asthma, you may:  · Wheeze, making a loud or soft whistling noise when you breathe in and out. · Cough a lot. This is the only symptom for some people. · Feel tightness in your chest.  · Feel short of breath. You may have rapid, shallow breathing or trouble breathing. · Have trouble sleeping because you're coughing or having a hard time breathing. · Get tired quickly during exercise. Symptoms may start soon after you're around things (triggers) that cause your asthma attacks. This is an early phase response. Or they may start several hours after exposure (late phase response). A late phase response can make it harder to figure out what triggers your symptoms. Symptoms can be mild or severe. You may have symptoms daily or just now and then. Or you may have something in between. Some people have symptoms that get worse at night, such as a cough and shortness of breath. How can you prevent asthma attacks? Certain things can make asthma symptoms worse. These are called triggers. When you are around a trigger, an asthma attack is more likely. Common triggers include:  · Cigarette smoke or air pollution. · Things you are allergic to, such as:  ? Pollen, mold, or dust mites. ? Pet hair, skin, or saliva.   · Illnesses, like colds, flu, or pneumonia. · Exercise. · Dry, cold air. Here are some ways to avoid a few common triggers:  · Do not smoke or allow others to smoke around you. If you need help quitting, talk to your doctor about stop-smoking programs and medicines. These can increase your chances of quitting for good. · If there is a lot of pollution, pollen, or dust outside, stay at home and keep your windows closed. Use an air conditioner or air filter in your home. Check your local weather report or newspaper for air quality and pollen reports. · Get the flu vaccine every year. Talk to your doctor about getting a pneumococcal shot. Wash your hands often to prevent infections. · Avoid exercising outdoors in cold weather. If you are outdoors in cold weather, wear a scarf around your face and breathe through your nose. How is asthma treated? There are two parts to treating asthma, which are outlined in your asthma action plan. The goals are to:  · Control asthma over the long term. The asthma action plan tells you which medicine you may need to take every day. This is called a controller medicine. It helps to reduce the swelling of the airways and prevent asthma attacks. · Treat asthma attacks when they occur. The asthma action plan tells you what to do when you have an asthma attack. It helps you identify triggers that can cause your attacks. You use quick-relief medicine during an attack. The asthma plan also helps you track your symptoms and know how well the treatment is working. Follow-up care is a key part of your treatment and safety. Be sure to make and go to all appointments, and call your doctor if you are having problems. It's also a good idea to know your test results and keep a list of the medicines you take. Where can you learn more? Go to http://davin-mando.info/  Enter G441 in the search box to learn more about \"Learning About Asthma. \"  Current as of: June 9, 2019Content Version: 12.4  © 0028-0915 Healthwise, Incorporated. Patient Education        COPD and Asthma: Care Instructions  Your Care Instructions    Some people who have chronic obstructive pulmonary disease (COPD) also have asthma. Both of these problems can damage your lungs. This makes it very important to control them. Asthma causes the airways that lead to the lungs to swell and become narrow. This makes it hard to breathe. You may wheeze or cough. If you have a bad attack, you may need emergency care. There are two parts to treating asthma. · Controlling asthma over the long term. · Treating attacks when they occur. You and your doctor can make an asthma treatment plan that will help. This plan tells you the medicines you take every day to reduce the swelling in your airways and prevent attacks. It also tells you what to do if you have an asthma attack. Follow-up care is a key part of your treatment and safety. Be sure to make and go to all appointments, and call your doctor if you are having problems. It's also a good idea to know your test results and keep a list of the medicines you take. How can you care for yourself at home? To control asthma over the long term  Medicines  Controller medicines reduce swelling in your lungs. They also prevent asthma attacks. Take your controller medicine exactly as prescribed. Talk to your doctor if you have any problems with your medicine. · Inhaled corticosteroid is a common and effective controller medicine. Using it the right way can prevent or reduce most side effects. · Take your controller medicine every day, not just when you have symptoms. This helps prevent problems before they occur. · Always bring your asthma medicine with you when you travel. · Your doctor may prescribe long-acting medicine that combines a corticosteroid with a beta2-agonist. Follow your doctor's instructions exactly about how to take a long-acting medicine. Examples include:  ?  Fluticasone and salmeterol (Advair). ? Budesonide and formoterol (Symbicort). · Do not depend on your controller medicines to stop an asthma attack that has already started. They do not work fast enough to help. · Your doctor may also prescribe anticholinergic inhalers. These include ipratropium (Atrovent) and tiotropium (Spiriva). Education  · Learn what sets off an asthma attack. Avoid these triggers when you can. Common triggers include smoke, air pollution, pollen, animal dander, colds, stress, and cold air. · Do not smoke. Smoking can make COPD and asthma worse. If you need help quitting, talk to your doctor about stop-smoking programs and medicines. These can increase your chances of quitting for good. · Check yourself for symptoms to know which step to follow in your action plan. Watch for things like being short of breath, having chest tightness, coughing, and wheezing. Also notice if symptoms wake you up at night or if you get tired quickly when you exercise. · You may want to learn how to use a peak flow meter. This measures how open your airways are. It may help you know when you will have an asthma attack. To treat attacks when they occur  Use your asthma action plan when you have an attack. Your quick-relief medicine, such as albuterol, will stop an asthma attack. It relaxes the muscles that get tight around the airways. · Take your quick-relief medicine exactly as prescribed. Talk with your doctor if you have any problems with your medicine. · Keep this medicine with you at all times. · You may need to use this medicine before you exercise. If your doctor prescribed corticosteroid pills to use during an attack, take them as directed. They may take hours to work, but they may shorten the attack and help you breathe better. When should you call for help? Call 911 anytime you think you may need emergency care.  For example, call if:    · You have severe trouble breathing.    Call your doctor now or seek immediate medical care if:    · You have new or worse shortness of breath.     · You are coughing more deeply or more often, especially if you notice more mucus or a change in the color of your mucus.     · You cough up blood.     · You have new or increased swelling in your legs or belly.     · You have a fever.     · You have used your quick-relief medicine but you are still short of breath.    Watch closely for changes in your health, and be sure to contact your doctor if you have any problems. Where can you learn more? Go to http://davin-mando.info/  Enter A350 in the search box to learn more about \"COPD and Asthma: Care Instructions. \"  Current as of: June 9, 2019Content Version: 12.4  © 7176-1576 Healthwise, Incorporated. Care instructions adapted under license by [employer] (which disclaims liability or warranty for this information). If you have questions about a medical condition or this instruction, always ask your healthcare professional. [address] any warranty or liability for your use of this information. [employer] (which disclaims liability or warranty for this information). If you have questions about a medical condition or this instruction, always ask your healthcare professional. [address] any warranty or liability for your use of this information.        Use Duo-Neb every 4 hours for the next 2 days

## 2020-04-30 NOTE — ED PROVIDER NOTES
EMERGENCY DEPARTMENT HISTORY AND PHYSICAL EXAM      Date: 4/30/2020  Patient Name: Richard Wright    History of Presenting Illness     Chief Complaint   Patient presents with    Wheezing     onset of sxs, Sunday - spoke with PCP for [place] attack\" - sxs worsening leading to ED visit - no relief from Rx - used three duoneb at home - Denies fevers        History Provided By: Patient    HPI: Richard Wright, 49 y.o. female presents to the ED with cc of shortness of breath and wheezing. Patient states she has a history of asthma and had been seen by her PCP recently and has been using her albuterol nebulizer treatments at home. Patient states she is using albuterol only there is no Atrovent included. Patient states she had used 3 breathing treatments last night and continued to have some problems and issues she had called her PCP to see if he could put her on steroids and he instructed her to come to the emergency department. Patient states her symptoms are very similar to her asthma attacks in the past she has had not had a fever and there is been no productive cough. Patient states she does have a nebulizer at home however she does not have a rescue inhaler. Patient has not been on steroids recently. Patient is not currently on antibiotics or has been on antibiotics. Patient also states no recent COVID exposures that she is aware of and feels that all of her symptoms are related to her asthma. Patient denies any fever or chills. Patient denies any nasal congestion or other cold symptoms. Patient states she has a dry cough. Patient denies any chest pain or abdominal pain. Patient denies any nausea vomiting or diarrhea. There are no other complaints, changes, or physical findings at this time. PCP: Zhang Lopez MD    No current facility-administered medications on file prior to encounter.       Current Outpatient Medications on File Prior to Encounter   Medication Sig Dispense Refill    Nebulizer & Compressor machine 1 Each by Does Not Apply route every four (4) hours as needed for Wheezing or Shortness of Breath. 1 Each 0    albuterol (PROVENTIL VENTOLIN) 2.5 mg /3 mL (0.083 %) nebu 3 mL by Nebulization route every four (4) hours as needed for Wheezing. 30 Nebule 0    [DISCONTINUED] albuterol (PROAIR HFA) 90 mcg/actuation inhaler Take 2 Puffs by inhalation every four (4) hours as needed for Wheezing. 1 Inhaler 1    cetirizine (ZYRTEC) 10 mg tablet Take 1 Tab by mouth daily. 20 Tab 0    ergocalciferol (VITAMIN D2) 50,000 unit capsule Take 50,000 Units by mouth. Patient takes on Thursday's         Past History     Past Medical History:  Past Medical History:   Diagnosis Date    Asthma     Hyperthyroidism        Past Surgical History:  Past Surgical History:   Procedure Laterality Date    BREAST SURGERY PROCEDURE UNLISTED      cyst left breast    HX HYSTERECTOMY      still has ovaries    HX POLYPECTOMY      removed from nose       Family History:  No family history on file. Social History:  Social History     Tobacco Use    Smoking status: Never Smoker    Smokeless tobacco: Never Used   Substance Use Topics    Alcohol use: No    Drug use: No       Allergies: Allergies   Allergen Reactions    Latex Rash    Other Food Hives     Rye    Advil [Ibuprofen] Anaphylaxis    Amoxil [Amoxicillin] Unknown (comments)    Sulfa (Sulfonamide Antibiotics) Rash    Sweet Potato Hives    Wheat Hives         Review of Systems   Review of Systems   Constitutional: Negative. Negative for appetite change, chills, fatigue and fever. HENT: Negative. Negative for congestion, rhinorrhea, sinus pressure and sore throat. Eyes: Negative. Respiratory: Positive for shortness of breath and wheezing. Negative for cough, choking and chest tightness. Cardiovascular: Negative. Negative for chest pain, palpitations and leg swelling.    Gastrointestinal: Negative for abdominal pain, constipation, diarrhea, nausea and vomiting. Endocrine: Negative. Genitourinary: Negative. Negative for difficulty urinating, dysuria, flank pain and urgency. Musculoskeletal: Negative. Skin: Negative. Neurological: Negative. Negative for dizziness, speech difficulty, weakness, light-headedness, numbness and headaches. Psychiatric/Behavioral: Negative. All other systems reviewed and are negative. Physical Exam   Physical Exam  Vitals signs and nursing note reviewed. Constitutional:       General: She is not in acute distress. Appearance: She is well-developed. She is not diaphoretic. HENT:      Head: Normocephalic and atraumatic. Mouth/Throat:      Mouth: Mucous membranes are moist.      Pharynx: No oropharyngeal exudate. Eyes:      Conjunctiva/sclera: Conjunctivae normal.      Pupils: Pupils are equal, round, and reactive to light. Neck:      Musculoskeletal: Normal range of motion and neck supple. Vascular: No JVD. Trachea: No tracheal deviation. Cardiovascular:      Rate and Rhythm: Normal rate and regular rhythm. Heart sounds: Normal heart sounds. No murmur. Pulmonary:      Effort: Pulmonary effort is normal. No respiratory distress. Breath sounds: No stridor. Wheezing present. No rales. Musculoskeletal: Normal range of motion. General: No tenderness. Skin:     General: Skin is warm and dry. Neurological:      Mental Status: She is alert and oriented to person, place, and time. Cranial Nerves: No cranial nerve deficit. Comments: No gross motor or sensory deficits    Psychiatric:         Behavior: Behavior normal.         Diagnostic Study Results     Labs -   None    Radiologic Studies -   No orders to display     CT Results  (Last 48 hours)    None        CXR Results  (Last 48 hours)    None          Medical Decision Making   I am the first provider for this patient.     I reviewed the vital signs, available nursing notes, past medical history, past surgical history, family history and social history. Vital Signs-Reviewed the patient's vital signs. Patient Vitals for the past 12 hrs:   Temp Pulse Resp BP SpO2   04/30/20 0135 98.3 °F (36.8 °C) 80 20 102/84 97 %   04/30/20 0133     99 %   04/30/20 0115    113/78 98 %   04/30/20 0100    108/87 98 %   04/30/20 0024 98.3 °F (36.8 °C)  18 (!) 147/98 100 %         Records Reviewed: Nursing Notes and Old Medical Records    Provider Notes (Medical Decision Making):   DDx- Asthma exacerbation    ED Course:   Initial assessment performed. The patients presenting problems have been discussed, and they are in agreement with the care plan formulated and outlined with them. I have encouraged them to ask questions as they arise throughout their visit. Given that patient has been using albuterol at home patient was given 1 DuoNeb treatment here with improvement. Patient was also started on steroids. Prescriptions were written both for prednisone Dosepak in addition to DuoNeb solution for at home as well as an MDI. Patient resting comfortably upon discharge discussed return to the ED with any worsening symptoms. Disposition:  DC home    DISCHARGE PLAN:  1.    Discharge Medication List as of 4/30/2020  2:46 AM      START taking these medications    Details   predniSONE (STERAPRED DS) 10 mg dose pack Take as directed, Normal, Disp-48 Tab, R-0      albuterol-ipratropium (DUO-NEB) 2.5 mg-0.5 mg/3 ml nebu 3 mL by Nebulization route every four (4) hours as needed for Wheezing., Normal, Disp-60 Nebule, R-0         CONTINUE these medications which have CHANGED    Details   albuterol (PROVENTIL HFA, VENTOLIN HFA, PROAIR HFA) 90 mcg/actuation inhaler Take 2 Puffs by inhalation every four (4) hours as needed for Wheezing., Normal, Disp-1 Inhaler, R-1         CONTINUE these medications which have NOT CHANGED    Details   Nebulizer & Compressor machine 1 Each by Does Not Apply route every four (4) hours as needed for Wheezing or Shortness of Breath., Normal, Disp-1 Each, R-0      albuterol (PROVENTIL VENTOLIN) 2.5 mg /3 mL (0.083 %) nebu 3 mL by Nebulization route every four (4) hours as needed for Wheezing., Normal, Disp-30 Nebule, R-0      cetirizine (ZYRTEC) 10 mg tablet Take 1 Tab by mouth daily. , Normal, Disp-20 Tab, R-0      ergocalciferol (VITAMIN D2) 50,000 unit capsule Take 50,000 Units by mouth. Patient takes on Thursday's, Historical Med           2. Follow-up Information     Follow up With Specialties Details Why Contact Info    Natan Puga MD [facility]  As needed [facility] [address]  [phone]          3. Return to ED if worse     Diagnosis     Clinical Impression:   1. Moderate persistent asthma with acute exacerbation        Attestations:    Satya Seymour, DO    Please note that this dictation was completed with [employer], the computer voice recognition software. Quite often unanticipated grammatical, syntax, homophones, and other interpretive errors are inadvertently transcribed by the computer software. Please disregard these errors. Please excuse any errors that have escaped final proofreading. Thank you.

## 2020-05-01 ENCOUNTER — PATIENT OUTREACH (OUTPATIENT)
Dept: FAMILY MEDICINE CLINIC | Age: 55
End: 2020-05-01

## 2020-05-01 NOTE — PROGRESS NOTES
Patient contacted regarding recent discharge and COVID-19 risk     Care Transition Nurse/ Ambulatory Care Manager contacted the patient by telephone to perform post discharge assessment. Verified name and  with patient as identifiers. Patient has following risk factors of: asthma. CTN/ACM reviewed discharge instructions, medical action plan and red flags related to discharge diagnosis. Reviewed and educated them on any new and changed medications related to discharge diagnosis. Advised obtaining a 90-day supply of all daily and as-needed medications. Pt reported started Prednisoe dose pack, & has DuoNeb solutions. Pharm did not have Albuterol Inhaler. Waiting for pharm call when it's available. Still having some wheezing, but not worsened. Education provided regarding infection prevention, and signs and symptoms of COVID-19 and when to seek medical attention with patient who verbalized understanding. Discussed exposure protocols and quarantine from [address] you at higher risk for severe illness  and given an opportunity for questions and concerns. The patient agrees to contact the COVID-19 hotline [phone] or PCP office for questions related to their healthcare. CTN/ACM provided contact information for future reference. From CDC: Are you at higher risk for severe illness?  Wash your hands often.  Avoid close contact (6 feet, which is about two arm lengths) with people who are sick.  Put distance between yourself and other people if COVID-19 is spreading in your community.  Clean and disinfect frequently touched surfaces.  Avoid all cruise travel and non-essential air travel.  Call your healthcare professional if you have concerns about COVID-19 and your underlying condition or if you are sick.     For more information on steps you can take to protect yourself, see CDC's How to Protect Yourself      Patient/family/caregiver given information for Lilia Mckeon and agrees to enroll yes  Patient's preferred e-mail:  [email or web address]  Patient's preferred phone number: [phone]  Based on Loop alert triggers, patient will be contacted by nurse care manager for worsening symptoms. Pt will be further monitored by COVID Loop Team based on severity of symptoms and risk factors.

## 2022-07-07 ENCOUNTER — OFFICE VISIT (OUTPATIENT)
Dept: ENDOCRINOLOGY | Age: 57
End: 2022-07-07
Payer: MEDICAID

## 2022-07-07 VITALS
BODY MASS INDEX: 23.99 KG/M2 | DIASTOLIC BLOOD PRESSURE: 78 MMHG | SYSTOLIC BLOOD PRESSURE: 120 MMHG | HEIGHT: 65 IN | HEART RATE: 57 BPM | WEIGHT: 144 LBS

## 2022-07-07 DIAGNOSIS — E03.9 ACQUIRED HYPOTHYROIDISM: Primary | ICD-10-CM

## 2022-07-07 PROCEDURE — 99204 OFFICE O/P NEW MOD 45 MIN: CPT | Performed by: INTERNAL MEDICINE

## 2022-07-07 RX ORDER — DUPILUMAB 300 MG/2ML
INJECTION, SOLUTION SUBCUTANEOUS
COMMUNITY
Start: 2022-06-14 | End: 2022-09-12 | Stop reason: ALTCHOICE

## 2022-07-07 RX ORDER — LEVOTHYROXINE SODIUM 75 UG/1
TABLET ORAL
COMMUNITY
Start: 2022-07-06 | End: 2022-07-10 | Stop reason: ALTCHOICE

## 2022-07-07 NOTE — PROGRESS NOTES
Chief Complaint   Patient presents with    Thyroid Problem       * New Patient Visit     General:  Dx hypothyroid ~ 15 years ago  On medication - then taken off - then put back on (endo in past)   Recently needed higher dose 50-->75mcg  Always on generic   Niece had thyroid removed   Wt based dose is 100mcg     Off med was more tired  Still does not feel right     Thyroid Symptoms - body aches, eyes feel bigger  **has decreased energy**, denies change in weight, denies change in appetite, denies sleep issues, denies hair changes, no skin changes, denies sweats, denies hot/cold intolerance, denies tremor, denies palpitations, denies change in bowels, no change in menses, denies mood changes    Neck Symptoms  denies dysphagia, denies anterior neck pain, denies neck swelling, notes no nodules    Labs/Studies    12/14/21 TSH 40 (restarted 50mcg)  3/14/22 TSH 14.6 (increased 75mcg)  5/31/22 TSH 11.0 (was to incr to 100mcg - not done)    Obtained after visit:  1/6/10 TSH 0.005  12/16/11 TSH 0.03, TT4 9.8, TT3 151  1/6/12 TSH 0.07, FT4 1.24  11/19/13 TSH 0.005, FTI 3.5  8/15/14 TSH 0.005, FTI 3.8  9/9/14 THs 0.005, FTI 2.6  10/17/14 TSH 0.005, FT4 1.65, FT3 2.81H, Vit D 11.1  4/16/15 TSH 38.8, FT4 0.56, TRAB 1.24, TT3 56  7/16/15 TSH 50.8, FT4 .051, TT3 77, TRAB 0.87  11/19/15 TSH 3.15, FT4 1.12, TT3 111, TRAB 0.73  2/18/16 TSH 2.1, FT4 0.91, TT3 86, TRAB 0.98  9/29/16 TSH 0.04, FT4 1.45, TT3 134, TRAB 1.1, 11/22/20 TSH 7.5, FT4 1.13/1/21 TSh 5.6  4/12/21 TSH 4.9, FT4 1.3        Past Medical History:   Diagnosis Date    Asthma     Hypothyroidism         Past Surgical History:   Procedure Laterality Date    HX HYSTERECTOMY      still has ovaries    HX POLYPECTOMY      removed from nose    OH BREAST SURGERY PROCEDURE UNLISTED      cyst left breast        Social History     Tobacco Use    Smoking status: Never Smoker    Smokeless tobacco: Never Used   Substance Use Topics    Alcohol use: No           No family history on file. Blood pressure 120/78, pulse (!) 57, height 5' 5\" (1.651 m), weight 144 lb (65.3 kg). Weight Metrics 7/7/2022 4/30/2020 2/15/2020 1/3/2020 7/15/2019 3/2/2019 12/28/2018   Weight 144 lb 152 lb 12.5 oz 154 lb 12.2 oz 151 lb 3.8 oz 151 lb 14.4 oz 153 lb 155 lb   BMI 23.96 kg/m2 25.42 kg/m2 24.98 kg/m2 24.41 kg/m2 25.28 kg/m2 25.46 kg/m2 25.79 kg/m2        EXAM:  - GENERAL: NCAT, Appears well nourished   - EYES: EOMI, non-icteric, no proptosis   - Ear/Nose/Throat: NCAT, no visible inflammation or masses   - CARDIOVASCULAR: no cyanosis, no visible JVD   - RESPIRATORY: respiratory effort normal without any distress or labored breathing   - MUSCULOSKELETAL: Normal ROM of neck and upper extremities observed   - SKIN: No rash on face  - NEUROLOGIC:  No facial asymmetry (Cranial nerve 7 motor function), No gaze palsy   - PSYCHIATRIC: Normal affect, Normal insight and judgement      Assessment/Plan:   1. Acquired hypothyroidism  History not clear - requested prior endo notes  Got many years worth of labs after visit (on [place])  Was hyper for some time, then hypo  More recently off med and hypo and 50mcg added back, then increased to 75mcg but TSH was still not normal  Appears that provider (patient first) wanted to increase to 100mcg but did not happen  Wt based dose is 100mcg  May be one were small dose changes can have large TSH shifts  Does not seem too sensitive to high/low TSH - does have Sx, just not that bad  Plan to switch to brand  Check current status and adjust prn      Orders Placed This Encounter    TSH 3RD GENERATION    THYROID ANTIBODY PANEL        Follow-up and Dispositions    · Return in about 2 months (around 9/7/2022).

## 2022-07-07 NOTE — LETTER
7/7/2022    Patient: Abdoulaye Goodwin   YOB: 1965   Date of Visit: 7/7/2022     Wanda Daley MD  [address] [facility] [address]  Via Fax: [phone]    Dear Wanda Daley MD,      Thank you for referring Ms. Abdoulaye Goodwin to [facility] for evaluation. My notes for this consultation are attached. If you have questions, please do not hesitate to call me. I look forward to following your patient along with you.       Sincerely,    Barbara Akers MD

## 2022-07-08 LAB
THYROGLOB AB SERPL-ACNC: <1 IU/ML (ref 0–0.9)
THYROPEROXIDASE AB SERPL-ACNC: <8 IU/ML (ref 0–34)
TSH SERPL DL<=0.005 MIU/L-ACNC: 6.15 UIU/ML (ref 0.45–4.5)

## 2022-07-10 RX ORDER — LEVOTHYROXINE SODIUM 75 UG/1
TABLET ORAL
Qty: 32 TABLET | Refills: 1 | Status: SHIPPED | OUTPATIENT
Start: 2022-07-10 | End: 2022-08-31

## 2022-08-14 DIAGNOSIS — E03.9 ACQUIRED HYPOTHYROIDISM: ICD-10-CM

## 2022-08-31 DIAGNOSIS — E03.9 ACQUIRED HYPOTHYROIDISM: ICD-10-CM

## 2022-08-31 RX ORDER — LEVOTHYROXINE SODIUM 75 UG/1
TABLET ORAL
Qty: 32 TABLET | Refills: 1 | Status: SHIPPED | OUTPATIENT
Start: 2022-08-31 | End: 2022-09-30

## 2022-09-11 NOTE — PROGRESS NOTES
Chief Complaint   Patient presents with    Thyroid Problem       * Since Last Visit: 7/7/2022     Switched to brand  Increased 75mcg to extra 1/2 tab once a week  Takes correctly  TPO/Tg are negative!     Feels great this time - energy and body aches      General:  From initial visit 7/7/22  Dx hypothyroid ~ 15 years ago  On medication - then taken off - then put back on (endo in past)   Recently needed higher dose 50-->75mcg  Always on generic   Niece had thyroid removed   Wt based dose is 100mcg     Off med was more tired  Still does not feel right     Thyroid Symptoms - denies change in energy, denies change in weight, denies change in appetite, denies sleep issues, denies hair changes, no skin changes, denies sweats, denies hot/cold intolerance, denies tremor, denies palpitations, denies change in bowels, no change in menses, denies mood changes    Neck Symptoms  denies dysphagia, denies anterior neck pain, denies neck swelling, notes no nodules    Labs/Studies    10/22/20 TSH 7.5, FT4 1.1  3/1/21 TSH 5.6  4/12/21 TSH 4.9, FT4 1.3  12/14/21 TSH 40 (off med; restarted 50mcg)  3/14/22 TSH 14.6 (increased 75mcg)  5/31/22 TSH 11.0 (was to incr to 100mcg - not done; but down to 6.1 w/o change)  7/7/22 TSH 6.2 (still on 75mcg)    Obtained after visit:  1/6/10 TSH 0.005  12/16/11 TSH 0.03, TT4 9.8, TT3 151  1/6/12 TSH 0.07, FT4 1.24  11/19/13 TSH 0.005, FTI 3.5  8/15/14 TSH 0.005, FTI 3.8  9/9/14 THs 0.005, FTI 2.6  10/17/14 TSH 0.005, FT4 1.65, FT3 2.81H, Vit D 11.1  4/16/15 TSH 38.8, FT4 0.56, TRAB 1.24, TT3 56  7/16/15 TSH 50.8, FT4 .051, TT3 77, TRAB 0.87  11/19/15 TSH 3.15, FT4 1.12, TT3 111, TRAB 0.73  2/18/16 TSH 2.1, FT4 0.91, TT3 86, TRAB 0.98  9/29/16 TSH 0.04, FT4 1.45, TT3 134, TRAB 1.1, 11/22/20 TSH 7.5, FT4 1.13/1/21 TSh 5.6     Lab Results   Component Value Date/Time    TSH 6.150 (H) 07/07/2022 02:51 PM    Thyroid peroxidase Ab <8 07/07/2022 02:51 PM    Thyroglobulin Ab <1.0 07/07/2022 02:51 PM Past Medical History:   Diagnosis Date    Asthma     Hypothyroidism         Blood pressure 115/77, pulse 66, height 5' 5\" (1.651 m), weight 148 lb (67.1 kg). Weight Metrics 9/12/2022 7/7/2022 4/30/2020 2/15/2020 1/3/2020 7/15/2019 3/2/2019   Weight 148 lb 144 lb 152 lb 12.5 oz 154 lb 12.2 oz 151 lb 3.8 oz 151 lb 14.4 oz 153 lb   BMI 24.63 kg/m2 23.96 kg/m2 25.42 kg/m2 24.98 kg/m2 24.41 kg/m2 25.28 kg/m2 25.46 kg/m2        EXAM:  - THYROID: no nodules, no LAD, no tenderness, no swelling     Assessment/Plan:   Variability - More recently off med and hypo and 50mcg added back, then increased to 75mcg but TSH was still not normal  Appears that provider (patient first) wanted to increase to 100mcg but did not happen  And TSH went from 11 to 6 w/o dose change  So only increased 1/2 tab per week last visit of the 75mcg  Wt based dose is 100mcg  Feels great  May be one were small dose changes can have large TSH shifts  Does not seem too sensitive to high/low TSH - does have Sx, just not that bad  Check current status and adjust prn  TPO/Tg Ab negative!! No orders of the defined types were placed in this encounter. Follow-up and Dispositions    Return in about 6 months (around 3/12/2023).

## 2022-09-12 ENCOUNTER — OFFICE VISIT (OUTPATIENT)
Dept: ENDOCRINOLOGY | Age: 57
End: 2022-09-12
Payer: MEDICAID

## 2022-09-12 VITALS
WEIGHT: 148 LBS | DIASTOLIC BLOOD PRESSURE: 77 MMHG | SYSTOLIC BLOOD PRESSURE: 115 MMHG | BODY MASS INDEX: 24.66 KG/M2 | HEIGHT: 65 IN | HEART RATE: 66 BPM

## 2022-09-12 DIAGNOSIS — E03.9 ACQUIRED HYPOTHYROIDISM: Primary | ICD-10-CM

## 2022-09-12 PROCEDURE — 99214 OFFICE O/P EST MOD 30 MIN: CPT | Performed by: INTERNAL MEDICINE

## 2022-09-13 LAB — TSH SERPL DL<=0.005 MIU/L-ACNC: 4.71 UIU/ML (ref 0.45–4.5)

## 2022-09-30 DIAGNOSIS — E03.9 ACQUIRED HYPOTHYROIDISM: ICD-10-CM

## 2022-09-30 RX ORDER — LEVOTHYROXINE SODIUM 75 UG/1
TABLET ORAL
Qty: 32 TABLET | Refills: 1 | Status: SHIPPED | OUTPATIENT
Start: 2022-09-30 | End: 2022-11-02

## 2022-11-03 ENCOUNTER — APPOINTMENT (OUTPATIENT)
Dept: CT IMAGING | Age: 57
End: 2022-11-03
Attending: NURSE PRACTITIONER
Payer: MEDICAID

## 2022-11-03 ENCOUNTER — HOSPITAL ENCOUNTER (EMERGENCY)
Age: 57
Discharge: HOME | End: 2022-11-04
Attending: EMERGENCY MEDICINE
Payer: MEDICAID

## 2022-11-03 VITALS
SYSTOLIC BLOOD PRESSURE: 139 MMHG | OXYGEN SATURATION: 99 % | DIASTOLIC BLOOD PRESSURE: 73 MMHG | HEART RATE: 76 BPM | TEMPERATURE: 98.2 F | RESPIRATION RATE: 18 BRPM

## 2022-11-03 DIAGNOSIS — R11.2 NAUSEA VOMITING AND DIARRHEA: Primary | ICD-10-CM

## 2022-11-03 DIAGNOSIS — R19.7 NAUSEA VOMITING AND DIARRHEA: Primary | ICD-10-CM

## 2022-11-03 DIAGNOSIS — R10.84 ABDOMINAL PAIN, GENERALIZED: ICD-10-CM

## 2022-11-03 DIAGNOSIS — R16.0 HYPODENSE MASS OF LIVER: ICD-10-CM

## 2022-11-03 LAB
ALBUMIN SERPL-MCNC: 3.5 G/DL (ref 3.5–5)
ALBUMIN/GLOB SERPL: 0.7 {RATIO} (ref 1.1–2.2)
ALP SERPL-CCNC: 67 U/L (ref 45–117)
ALT SERPL-CCNC: 25 U/L (ref 12–78)
ANION GAP SERPL CALC-SCNC: 5 MMOL/L (ref 5–15)
AST SERPL-CCNC: 20 U/L (ref 15–37)
BASOPHILS # BLD: 0 K/UL (ref 0–0.1)
BASOPHILS NFR BLD: 0 % (ref 0–1)
BILIRUB SERPL-MCNC: 0.6 MG/DL (ref 0.2–1)
BUN SERPL-MCNC: 15 MG/DL (ref 6–20)
BUN/CREAT SERPL: 17 (ref 12–20)
CALCIUM SERPL-MCNC: 8.9 MG/DL (ref 8.5–10.1)
CHLORIDE SERPL-SCNC: 106 MMOL/L (ref 97–108)
CO2 SERPL-SCNC: 25 MMOL/L (ref 21–32)
COMMENT, HOLDF: NORMAL
CREAT SERPL-MCNC: 0.88 MG/DL (ref 0.55–1.02)
DIFFERENTIAL METHOD BLD: ABNORMAL
EOSINOPHIL # BLD: 0.1 K/UL (ref 0–0.4)
EOSINOPHIL NFR BLD: 2 % (ref 0–7)
ERYTHROCYTE [DISTWIDTH] IN BLOOD BY AUTOMATED COUNT: 12.5 % (ref 11.5–14.5)
GLOBULIN SER CALC-MCNC: 4.7 G/DL (ref 2–4)
GLUCOSE SERPL-MCNC: 94 MG/DL (ref 65–100)
HCT VFR BLD AUTO: 38.2 % (ref 35–47)
HGB BLD-MCNC: 12.1 G/DL (ref 11.5–16)
IMM GRANULOCYTES # BLD AUTO: 0 K/UL (ref 0–0.04)
IMM GRANULOCYTES NFR BLD AUTO: 0 % (ref 0–0.5)
LIPASE SERPL-CCNC: 65 U/L (ref 73–393)
LYMPHOCYTES # BLD: 0.9 K/UL (ref 0.8–3.5)
LYMPHOCYTES NFR BLD: 12 % (ref 12–49)
MCH RBC QN AUTO: 26.9 PG (ref 26–34)
MCHC RBC AUTO-ENTMCNC: 31.7 G/DL (ref 30–36.5)
MCV RBC AUTO: 85.1 FL (ref 80–99)
MONOCYTES # BLD: 0.5 K/UL (ref 0–1)
MONOCYTES NFR BLD: 7 % (ref 5–13)
NEUTS SEG # BLD: 5.9 K/UL (ref 1.8–8)
NEUTS SEG NFR BLD: 79 % (ref 32–75)
NRBC # BLD: 0 K/UL (ref 0–0.01)
NRBC BLD-RTO: 0 PER 100 WBC
PLATELET # BLD AUTO: 201 K/UL (ref 150–400)
PMV BLD AUTO: 10.9 FL (ref 8.9–12.9)
POTASSIUM SERPL-SCNC: 3.6 MMOL/L (ref 3.5–5.1)
PROT SERPL-MCNC: 8.2 G/DL (ref 6.4–8.2)
RBC # BLD AUTO: 4.49 M/UL (ref 3.8–5.2)
SAMPLES BEING HELD,HOLD: NORMAL
SODIUM SERPL-SCNC: 136 MMOL/L (ref 136–145)
WBC # BLD AUTO: 7.4 K/UL (ref 3.6–11)

## 2022-11-03 PROCEDURE — 74011250636 HC RX REV CODE- 250/636: Performed by: NURSE PRACTITIONER

## 2022-11-03 PROCEDURE — 80053 COMPREHEN METABOLIC PANEL: CPT

## 2022-11-03 PROCEDURE — 96374 THER/PROPH/DIAG INJ IV PUSH: CPT

## 2022-11-03 PROCEDURE — 83690 ASSAY OF LIPASE: CPT

## 2022-11-03 PROCEDURE — 36415 COLL VENOUS BLD VENIPUNCTURE: CPT

## 2022-11-03 PROCEDURE — 96375 TX/PRO/DX INJ NEW DRUG ADDON: CPT

## 2022-11-03 PROCEDURE — 74011000636 HC RX REV CODE- 636: Performed by: RADIOLOGY

## 2022-11-03 PROCEDURE — 74011000250 HC RX REV CODE- 250: Performed by: NURSE PRACTITIONER

## 2022-11-03 PROCEDURE — 85025 COMPLETE CBC W/AUTO DIFF WBC: CPT

## 2022-11-03 PROCEDURE — 74177 CT ABD & PELVIS W/CONTRAST: CPT

## 2022-11-03 PROCEDURE — 99285 EMERGENCY DEPT VISIT HI MDM: CPT

## 2022-11-03 RX ORDER — ONDANSETRON 2 MG/ML
4 INJECTION INTRAMUSCULAR; INTRAVENOUS
Status: COMPLETED | OUTPATIENT
Start: 2022-11-03 | End: 2022-11-03

## 2022-11-03 RX ORDER — MORPHINE SULFATE 2 MG/ML
2 INJECTION, SOLUTION INTRAMUSCULAR; INTRAVENOUS
Status: COMPLETED | OUTPATIENT
Start: 2022-11-03 | End: 2022-11-03

## 2022-11-03 RX ADMIN — MORPHINE SULFATE 2 MG: 2 INJECTION, SOLUTION INTRAMUSCULAR; INTRAVENOUS at 23:41

## 2022-11-03 RX ADMIN — SODIUM CHLORIDE 1000 ML: 9 INJECTION, SOLUTION INTRAVENOUS at 23:40

## 2022-11-03 RX ADMIN — ONDANSETRON HYDROCHLORIDE 4 MG: 2 SOLUTION INTRAMUSCULAR; INTRAVENOUS at 23:40

## 2022-11-03 RX ADMIN — IOPAMIDOL 100 ML: 755 INJECTION, SOLUTION INTRAVENOUS at 23:40

## 2022-11-03 RX ADMIN — SODIUM CHLORIDE, PRESERVATIVE FREE 20 MG: 5 INJECTION INTRAVENOUS at 23:41

## 2022-11-03 NOTE — Clinical Note
[address]  [address] [facility] [address]  [phone]    Work/School Note    Date: 11/3/2022    To Whom It May concern:    Marycruz Washington was seen and treated today in the emergency room by the following provider(s):  Attending Provider: Josemanuel Devine MD  Nurse Practitioner: Alfred Goldberg NP. Marycruz Washington is excused from work/school on 11/04/22 and 11/05/22. She is medically clear to return to work/school on 11/6/2022.        Sincerely,          Coretta Coronel NP

## 2022-11-04 RX ORDER — DICYCLOMINE HYDROCHLORIDE 10 MG/5ML
20 SOLUTION ORAL
Qty: 200 ML | Refills: 0 | Status: SHIPPED | OUTPATIENT
Start: 2022-11-04 | End: 2022-11-09

## 2022-11-04 RX ORDER — ONDANSETRON 4 MG/1
4 TABLET, FILM COATED ORAL
Qty: 20 TABLET | Refills: 0 | Status: SHIPPED | OUTPATIENT
Start: 2022-11-04

## 2022-11-04 NOTE — DISCHARGE INSTRUCTIONS
You may have some mild pancreatitis, but the rest of your findings are concerning for likely viral gastroenteritis   Please use a clear liquid diet for the next 24-48 hours, take medications as needed to help prevent nausea/ vomiting.  Then advance to bland foods- like bananas, rice, apples, toast.    Follow up with a primary care provider, call Monday to set up an appointment   Return to the ER for any worsening or worrisome symptoms

## 2022-11-04 NOTE — ED PROVIDER NOTES
DEONNA Hannah is a 55 y.o. female with Hx of asthma, hypothyroidism who presents ambulatory by herself to [facility] ED with cc of abdominal pain. Patient reports intermittent generalized abdominal pain with associative nausea, vomiting, diarrhea over the course of the last 4 days. States that she has intermittently been able to tolerate p.o. intake but not with consistency. She denies any known sick exposures or international travel. She denies any fevers, chills, cough, congestion, shortness of breath, chest pain, urinary concerns, blood in stool/ emesis. Denies any recent abdominal or pelvic surgeries. Denies tobacco, alcohol, substance abuse. Patient is no longer having menstrual cycles. PCP: Negrito Call MD    There are no other complaints, changes or physical findings at this time. Past Medical History:   Diagnosis Date    Asthma     Hypothyroidism        Past Surgical History:   Procedure Laterality Date    HX HYSTERECTOMY      still has ovaries    HX POLYPECTOMY      removed from nose    CO BREAST SURGERY PROCEDURE UNLISTED      cyst left breast         No family history on file.     Social History     Socioeconomic History    Marital status: SINGLE     Spouse name: Not on file    Number of children: Not on file    Years of education: Not on file    Highest education level: Not on file   Occupational History    Not on file   Tobacco Use    Smoking status: Never    Smokeless tobacco: Never   Substance and Sexual Activity    Alcohol use: No    Drug use: No    Sexual activity: Not on file   Other Topics Concern    Not on file   Social History Narrative    Not on file     Social Determinants of Health     Financial Resource Strain: Not on file   Food Insecurity: Not on file   Transportation Needs: Not on file   Physical Activity: Not on file   Stress: Not on file   Social Connections: Not on file   Intimate Partner Violence: Not on file   Housing Stability: Not on file         ALLERGIES: Latex, Other food, Advil [ibuprofen], Amoxil [amoxicillin], Sulfa (sulfonamide antibiotics), Sweet potato, and Wheat    Review of Systems   Constitutional:  Positive for appetite change. Negative for activity change, chills and fever. HENT:  Negative for congestion, rhinorrhea and sore throat. Eyes:  Negative for pain, discharge and visual disturbance. Respiratory:  Negative for cough and shortness of breath. Cardiovascular:  Negative for chest pain. Gastrointestinal:  Positive for abdominal pain, diarrhea, nausea and vomiting. Genitourinary:  Negative for dysuria, flank pain, frequency and urgency. Musculoskeletal:  Negative for arthralgias, back pain, gait problem, joint swelling, myalgias and neck pain. Skin:  Negative for color change and rash. Neurological:  Negative for dizziness, weakness, light-headedness and headaches. Psychiatric/Behavioral:  Negative for agitation, behavioral problems and confusion. All other systems reviewed and are negative. Vitals:    11/03/22 2204   BP: 139/73   Pulse: 76   Resp: 18   Temp: 98.2 °F (36.8 °C)   SpO2: 99%            Physical Exam  Vitals and nursing note reviewed. Constitutional:       General: She is not in acute distress. Appearance: She is well-developed. HENT:      Head: Normocephalic and atraumatic. Right Ear: External ear normal.      Left Ear: External ear normal.   Eyes:      Conjunctiva/sclera: Conjunctivae normal.      Pupils: Pupils are equal, round, and reactive to light. Cardiovascular:      Rate and Rhythm: Normal rate and regular rhythm. Heart sounds: Normal heart sounds. Pulmonary:      Effort: Pulmonary effort is normal.      Breath sounds: Normal breath sounds. Abdominal:      Palpations: Abdomen is soft. Tenderness: There is no abdominal tenderness. There is no guarding or rebound. Musculoskeletal:         General: Normal range of motion.       Cervical back: Normal range of motion and neck supple. Skin:     General: Skin is warm and dry. Neurological:      Mental Status: She is alert and oriented to person, place, and time. Psychiatric:         Behavior: Behavior normal.         Thought Content: Thought content normal.         Judgment: Judgment normal.        MDM  Number of Diagnoses or Management Options  Abdominal pain, generalized  Hypodense mass of liver  Nausea vomiting and diarrhea  Diagnosis management comments: Differential diagnosis includes acute gastroenteritis, dehydration, diverticulitis    Patient presents emergency department with nausea, vomiting, diarrhea with abdominal pain over the last few days. She may have a mild pancreatitis on CT scan, however no elevated LFTs or lipase. She is able to tolerate p.o. at time of discharge and was made aware of her lab and CT findings. Reasons to return to the emergency department were reviewed, discussed dietary modifications and medications for discharge. Amount and/or Complexity of Data Reviewed  Clinical lab tests: ordered and reviewed  Tests in the radiology section of CPT®: ordered and reviewed  Review and summarize past medical records: yes           Procedures      LABORATORY TESTS:  Recent Results (from the past 12 hour(s))   CBC WITH AUTOMATED DIFF    Collection Time: 11/03/22 10:14 PM   Result Value Ref Range    WBC 7.4 3.6 - 11.0 K/uL    RBC 4.49 3.80 - 5.20 M/uL    HGB 12.1 11.5 - 16.0 g/dL    HCT 38.2 35.0 - 47.0 %    MCV 85.1 80.0 - 99.0 FL    MCH 26.9 26.0 - 34.0 PG    MCHC 31.7 30.0 - 36.5 g/dL    RDW 12.5 11.5 - 14.5 %    PLATELET 796 701 - 625 K/uL    MPV 10.9 8.9 - 12.9 FL    NRBC 0.0 0  WBC    ABSOLUTE NRBC 0.00 0.00 - 0.01 K/uL    NEUTROPHILS 79 (H) 32 - 75 %    LYMPHOCYTES 12 12 - 49 %    MONOCYTES 7 5 - 13 %    EOSINOPHILS 2 0 - 7 %    BASOPHILS 0 0 - 1 %    IMMATURE GRANULOCYTES 0 0.0 - 0.5 %    ABS. NEUTROPHILS 5.9 1.8 - 8.0 K/UL    ABS. LYMPHOCYTES 0.9 0.8 - 3.5 K/UL    ABS.  MONOCYTES 0.5 0.0 - 1.0 K/UL    ABS. EOSINOPHILS 0.1 0.0 - 0.4 K/UL    ABS. BASOPHILS 0.0 0.0 - 0.1 K/UL    ABS. IMM. GRANS. 0.0 0.00 - 0.04 K/UL    DF AUTOMATED     METABOLIC PANEL, COMPREHENSIVE    Collection Time: 11/03/22 10:14 PM   Result Value Ref Range    Sodium 136 136 - 145 mmol/L    Potassium 3.6 3.5 - 5.1 mmol/L    Chloride 106 97 - 108 mmol/L    CO2 25 21 - 32 mmol/L    Anion gap 5 5 - 15 mmol/L    Glucose 94 65 - 100 mg/dL    BUN 15 6 - 20 MG/DL    Creatinine 0.88 0.55 - 1.02 MG/DL    BUN/Creatinine ratio 17 12 - 20      eGFR >60 >60 ml/min/1.73m2    Calcium 8.9 8.5 - 10.1 MG/DL    Bilirubin, total 0.6 0.2 - 1.0 MG/DL    ALT (SGPT) 25 12 - 78 U/L    AST (SGOT) 20 15 - 37 U/L    Alk. phosphatase 67 45 - 117 U/L    Protein, total 8.2 6.4 - 8.2 g/dL    Albumin 3.5 3.5 - 5.0 g/dL    Globulin 4.7 (H) 2.0 - 4.0 g/dL    A-G Ratio 0.7 (L) 1.1 - 2.2     SAMPLES BEING HELD    Collection Time: 11/03/22 10:14 PM   Result Value Ref Range    SAMPLES BEING HELD 1RED     COMMENT        Add-on orders for these samples will be processed based on acceptable specimen integrity and analyte stability, which may vary by analyte. LIPASE    Collection Time: 11/03/22 10:14 PM   Result Value Ref Range    Lipase 65 (L) 73 - 393 U/L       IMAGING RESULTS:  CT ABD PELV W CONT   Final Result   Findings suspicious for mild pancreatitis with no evident   complication or obstruction and differential inclusive of infectious and   inflammatory etiologies.           MEDICATIONS GIVEN:  Medications   sodium chloride 0.9 % bolus infusion 1,000 mL (0 mL IntraVENous Stopped 11/4/22 0000)   ondansetron (ZOFRAN) injection 4 mg (4 mg IntraVENous Given 11/3/22 2340)   famotidine (PF) (PEPCID) 20 mg in 0.9% sodium chloride 10 mL injection (20 mg IntraVENous Given 11/3/22 2341)   morphine injection 2 mg (2 mg IntraVENous Given 11/3/22 2341)   iopamidoL (ISOVUE-370) 76 % injection 100 mL (100 mL IntraVENous Given 11/3/22 2340)       IMPRESSION:  1. Nausea vomiting and diarrhea    2. Abdominal pain, generalized    3. Hypodense mass of liver        PLAN:  1. Discharge Medication List as of 11/4/2022 12:54 AM        START taking these medications    Details   ondansetron hcl (Zofran) 4 mg tablet Take 1 Tablet by mouth every eight (8) hours as needed for Nausea or Vomiting., Normal, Disp-20 Tablet, R-0      dicyclomine (BENTYL) 10 mg/5 mL soln oral solution Take 10 mL by mouth every six (6) hours as needed for Pain for up to 5 days. , Normal, Disp-200 mL, R-0           CONTINUE these medications which have NOT CHANGED    Details   Unithroid 75 mcg tablet TAKE ONE TABLET DAILY ON AN EMPTY STOMACH EXCEPT SUNDAYS TAKE ONE AND ONE-HALF (1.5) TABLETS, Normal, Disp-32 Tablet, R-4, JASSI      cyanocobalamin, vitamin B-12, (VITAMIN B-12 PO) Take  by mouth., Historical Med      albuterol (PROVENTIL HFA, VENTOLIN HFA, PROAIR HFA) 90 mcg/actuation inhaler Take 2 Puffs by inhalation every four (4) hours as needed for Wheezing., Normal, Disp-1 Inhaler, R-1      albuterol-ipratropium (DUO-NEB) 2.5 mg-0.5 mg/3 ml nebu 3 mL by Nebulization route every four (4) hours as needed for Wheezing., Normal, Disp-60 Nebule, R-0      Nebulizer & Compressor machine 1 Each by Does Not Apply route every four (4) hours as needed for Wheezing or Shortness of Breath., Normal, Disp-1 Each, R-0      albuterol (PROVENTIL VENTOLIN) 2.5 mg /3 mL (0.083 %) nebu 3 mL by Nebulization route every four (4) hours as needed for Wheezing., Normal, Disp-30 Nebule, R-0      cetirizine (ZYRTEC) 10 mg tablet Take 1 Tab by mouth daily. , Normal, Disp-20 Tab, R-0           2.    Follow-up Information       Follow up With Specialties Details Why Jovita Arreguin MD Endocrinology Physician, Internal Medicine Physician Schedule an appointment as soon as possible for a visit   [facility] [address]  [phone]      [facility] [address] DEP Emergency Medicine Go to  As needed, If symptoms worsen [address]  [phone]          3.  Return to ED if worse

## 2023-05-11 RX ORDER — FLUTICASONE PROPIONATE 44 MCG
AEROSOL WITH ADAPTER (GRAM) INHALATION
COMMUNITY
Start: 2023-05-08

## 2023-05-11 RX ORDER — LEVOTHYROXINE SODIUM 75 UG/1
TABLET ORAL
COMMUNITY
Start: 2023-02-28 | End: 2023-05-18 | Stop reason: SDUPTHER

## 2023-05-12 RX ORDER — LEVOTHYROXINE SODIUM 75 UG/1
TABLET ORAL
Qty: 45 TABLET | Refills: 5 | OUTPATIENT
Start: 2023-05-12

## 2023-05-12 RX ORDER — LEVOTHYROXINE SODIUM 75 UG/1
TABLET ORAL
Qty: 64 TABLET | OUTPATIENT
Start: 2023-05-12

## 2023-05-18 RX ORDER — ALBUTEROL SULFATE 90 UG/1
2 AEROSOL, METERED RESPIRATORY (INHALATION) EVERY 4 HOURS PRN
COMMUNITY
Start: 2020-04-30

## 2023-05-18 RX ORDER — ALBUTEROL SULFATE 2.5 MG/3ML
2.5 SOLUTION RESPIRATORY (INHALATION) EVERY 4 HOURS PRN
COMMUNITY
Start: 2020-02-15

## 2023-05-18 RX ORDER — LEVOTHYROXINE SODIUM 75 UG/1
TABLET ORAL
Qty: 90 TABLET | Refills: 0 | Status: SHIPPED | OUTPATIENT
Start: 2023-05-18

## 2023-05-18 RX ORDER — IPRATROPIUM BROMIDE AND ALBUTEROL SULFATE 2.5; .5 MG/3ML; MG/3ML
3 SOLUTION RESPIRATORY (INHALATION) EVERY 4 HOURS PRN
COMMUNITY
Start: 2020-04-30

## 2023-08-10 RX ORDER — LEVOTHYROXINE SODIUM 75 UG/1
TABLET ORAL
Qty: 90 TABLET | Refills: 0 | Status: SHIPPED | OUTPATIENT
Start: 2023-08-10 | End: 2023-08-30 | Stop reason: SDUPTHER

## 2023-08-28 NOTE — PROGRESS NOTES
Chief Complaint   Patient presents with    Thyroid Problem     Having a lot of cramping in right leg at night       * Since Last Visit: 9/12/2022     Doing great! 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      Leg cramps, at night   H/o low D that caused cramps in past     General:  From initial visit 7/7/22  Dx hypothyroid ~ 15 years ago  On medication - then taken off - then put back on (endo in past)   Recently needed higher dose 50-->75mcg  Always on generic   Niece had thyroid removed   Wt based dose is 100mcg      Off med was more tired  Still does not feel right     Thyroid Symptoms  denies change in energy, denies change in weight, denies change in appetite, denies sleep issues, denies hair changes, no skin changes, denies sweats, denies hot/cold intolerance, denies tremor, denies palpitations, denies change in bowels, no change in menses, denies mood changes    Neck Symptoms  denies dysphagia, denies anterior neck pain, denies neck swelling, notes no nodules    Labs/Studies    1/6/10 TSH 0.005  12/16/11 TSH 0.03, TT4 9.8, TT3 151  1/6/12 TSH 0.07, FT4 1.24  11/19/13 TSH 0.005, FTI 3.5  8/15/14 TSH 0.005, FTI 3.8  9/9/14 TSH 0.005, FTI 2.6  10/17/14 TSH 0.005, FT4 1.65, FT3 2.81H, Vit D 11.1  4/16/15 TSH 38.8, FT4 0.56, TRAB 1.24, TT3 56  7/16/15 TSH 50.8, FT4 .051, TT3 77, TRAB 0.87  11/19/15 TSH 3.15, FT4 1.12, TT3 111, TRAB 0.73  2/18/16 TSH 2.1, FT4 0.91, TT3 86, TRAB 0.98  9/29/16 TSH 0.04, FT4 1.45, TT3 134, TRAB 1.1, 11/22/20 TSH 7.5, FT4 1.13/1/21 TSh 5.6    10/22/20 TSH 7.5, FT4 1.1  3/1/21 TSH 5.6  4/12/21 TSH 4.9, FT4 1.3  12/14/21 TSH 40 (off med; restarted 50mcg)  3/14/22 TSH 14.6 (increased 75mcg)  5/31/22 TSH 11.0 (was to incr to 100mcg - not done; but down to 6.1 w/o change)  7/7/22 TSH 6.2 (still on 75mcg)    Lab Results

## 2023-08-29 ENCOUNTER — OFFICE VISIT (OUTPATIENT)
Age: 58
End: 2023-08-29
Payer: MEDICAID

## 2023-08-29 VITALS
SYSTOLIC BLOOD PRESSURE: 123 MMHG | HEART RATE: 56 BPM | WEIGHT: 155 LBS | BODY MASS INDEX: 25.83 KG/M2 | DIASTOLIC BLOOD PRESSURE: 84 MMHG | HEIGHT: 65 IN

## 2023-08-29 DIAGNOSIS — E55.9 VITAMIN D DEFICIENCY: ICD-10-CM

## 2023-08-29 DIAGNOSIS — E03.9 ACQUIRED HYPOTHYROIDISM: Primary | ICD-10-CM

## 2023-08-29 DIAGNOSIS — R25.2 LEG CRAMPS: ICD-10-CM

## 2023-08-29 PROCEDURE — 99214 OFFICE O/P EST MOD 30 MIN: CPT | Performed by: INTERNAL MEDICINE

## 2023-08-30 LAB
25(OH)D3+25(OH)D2 SERPL-MCNC: 29.8 NG/ML (ref 30–100)
MAGNESIUM SERPL-MCNC: 2.3 MG/DL (ref 1.6–2.3)
TSH SERPL DL<=0.005 MIU/L-ACNC: 4.43 UIU/ML (ref 0.45–4.5)

## 2023-08-30 RX ORDER — LEVOTHYROXINE SODIUM 75 UG/1
TABLET ORAL
Qty: 96 TABLET | Refills: 3 | Status: SHIPPED | OUTPATIENT
Start: 2023-08-30

## 2024-08-27 NOTE — PROGRESS NOTES
Chief Complaint   Patient presents with    Thyroid Problem       * Since Last Visit: 8/29/2023      Doing great!   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     Legs bothering her and got some compression stockings    On Unithroid 75mcg to extra 1/2 tab once a week (=80mcg)  Takes correctly  TPO/Tg are negative     General:  From initial visit 7/7/22  Dx hypothyroid ~ 15 years ago  On medication - then taken off - then put back on (endo in past)   Recently needed higher dose 50-->75mcg  Always on generic   Niece had thyroid removed   Wt based dose is 100mcg      Off med was more tired  Still does not feel right     Thyroid Symptoms  denies change in energy, denies change in weight, denies change in appetite, denies sleep issues, denies hair changes, no skin changes, denies sweats, denies hot/cold intolerance, denies tremor, denies palpitations, denies change in bowels, no change in menses, denies mood changes    Neck Symptoms  denies dysphagia, denies anterior neck pain, denies neck swelling, notes no nodules    Labs/Studies    1/6/10 TSH 0.005  12/16/11 TSH 0.03, TT4 9.8, TT3 151  1/6/12 TSH 0.07, FT4 1.24  11/19/13 TSH 0.005, FTI 3.5  8/15/14 TSH 0.005, FTI 3.8  9/9/14 TSH 0.005, FTI 2.6  10/17/14 TSH 0.005, FT4 1.65, FT3 2.81H, Vit D 11.1  4/16/15 TSH 38.8, FT4 0.56, TRAB 1.24, TT3 56  7/16/15 TSH 50.8, FT4 .051, TT3 77, TRAB 0.87  11/19/15 TSH 3.15, FT4 1.12, TT3 111, TRAB 0.73  2/18/16 TSH 2.1, FT4 0.91, TT3 86, TRAB 0.98  9/29/16 TSH 0.04, FT4 1.45, TT3 134, TRAB 1.1, 11/22/20 TSH 7.5, FT4 1.13/1/21 TSh 5.6    10/22/20 TSH 7.5, FT4 1.1  3/1/21 TSH 5.6  4/12/21 TSH 4.9, FT4 1.3  12/14/21 TSH 40 (off med; restarted 50mcg)  3/14/22 TSH 14.6 (increased 75mcg)  5/31/22 TSH 11.0 (was to incr to 100mcg - not done; but down to 6.1 w/o change)  7/7/22 TSH 6.2 (still on 75mcg)    Lab Results   Component Value Date/Time    TSH 4.430 08/29/2023 12:43 PM    TPO <8

## 2024-08-28 ENCOUNTER — OFFICE VISIT (OUTPATIENT)
Age: 59
End: 2024-08-28
Payer: MEDICAID

## 2024-08-28 VITALS
SYSTOLIC BLOOD PRESSURE: 125 MMHG | BODY MASS INDEX: 28.49 KG/M2 | DIASTOLIC BLOOD PRESSURE: 82 MMHG | HEART RATE: 58 BPM | HEIGHT: 65 IN | WEIGHT: 171 LBS

## 2024-08-28 DIAGNOSIS — E03.9 ACQUIRED HYPOTHYROIDISM: Primary | ICD-10-CM

## 2024-08-28 PROCEDURE — G2211 COMPLEX E/M VISIT ADD ON: HCPCS | Performed by: INTERNAL MEDICINE

## 2024-08-28 PROCEDURE — 99214 OFFICE O/P EST MOD 30 MIN: CPT | Performed by: INTERNAL MEDICINE

## 2024-08-29 LAB — TSH SERPL DL<=0.005 MIU/L-ACNC: 13 UIU/ML (ref 0.45–4.5)

## 2024-08-31 LAB — T4 FREE SERPL-MCNC: 1.22 NG/DL (ref 0.82–1.77)

## 2024-10-18 DIAGNOSIS — E03.9 ACQUIRED HYPOTHYROIDISM: ICD-10-CM

## 2024-11-22 ENCOUNTER — HOSPITAL ENCOUNTER (EMERGENCY)
Facility: HOSPITAL | Age: 59
Discharge: HOME | End: 2024-11-22
Attending: EMERGENCY MEDICINE
Payer: MEDICAID

## 2024-11-22 VITALS
HEART RATE: 98 BPM | BODY MASS INDEX: 25.74 KG/M2 | WEIGHT: 164 LBS | RESPIRATION RATE: 16 BRPM | DIASTOLIC BLOOD PRESSURE: 82 MMHG | SYSTOLIC BLOOD PRESSURE: 128 MMHG | TEMPERATURE: 99.5 F | OXYGEN SATURATION: 97 % | HEIGHT: 67 IN

## 2024-11-22 DIAGNOSIS — R11.0 NAUSEA: ICD-10-CM

## 2024-11-22 DIAGNOSIS — R10.13 ABDOMINAL PAIN, EPIGASTRIC: Primary | ICD-10-CM

## 2024-11-22 DIAGNOSIS — R19.7 DIARRHEA, UNSPECIFIED TYPE: ICD-10-CM

## 2024-11-22 LAB
ALBUMIN SERPL-MCNC: 3.4 G/DL (ref 3.5–5)
ALBUMIN/GLOB SERPL: 0.7 (ref 1.1–2.2)
ALP SERPL-CCNC: 67 U/L (ref 45–117)
ALT SERPL-CCNC: 17 U/L (ref 12–78)
ANION GAP SERPL CALC-SCNC: 8 MMOL/L (ref 2–12)
APPEARANCE UR: CLEAR
AST SERPL-CCNC: 18 U/L (ref 15–37)
BACTERIA URNS QL MICRO: NEGATIVE /HPF
BASOPHILS # BLD: 0 K/UL (ref 0–0.1)
BASOPHILS NFR BLD: 0 % (ref 0–1)
BILIRUB SERPL-MCNC: 0.8 MG/DL (ref 0.2–1)
BILIRUB UR QL: NEGATIVE
BUN SERPL-MCNC: 12 MG/DL (ref 6–20)
BUN/CREAT SERPL: 12 (ref 12–20)
CALCIUM SERPL-MCNC: 8.8 MG/DL (ref 8.5–10.1)
CHLORIDE SERPL-SCNC: 106 MMOL/L (ref 97–108)
CO2 SERPL-SCNC: 25 MMOL/L (ref 21–32)
COLOR UR: YELLOW
CREAT SERPL-MCNC: 1.01 MG/DL (ref 0.55–1.02)
DIFFERENTIAL METHOD BLD: ABNORMAL
EOSINOPHIL # BLD: 0 K/UL (ref 0–0.4)
EOSINOPHIL NFR BLD: 0 % (ref 0–7)
EPITH CASTS URNS QL MICRO: ABNORMAL /LPF
ERYTHROCYTE [DISTWIDTH] IN BLOOD BY AUTOMATED COUNT: 13.2 % (ref 11.5–14.5)
GLOBULIN SER CALC-MCNC: 4.8 G/DL (ref 2–4)
GLUCOSE SERPL-MCNC: 96 MG/DL (ref 65–100)
GLUCOSE UR STRIP.AUTO-MCNC: NEGATIVE MG/DL
HCT VFR BLD AUTO: 37.6 % (ref 35–47)
HGB BLD-MCNC: 11.8 G/DL (ref 11.5–16)
HGB UR QL STRIP: ABNORMAL
IMM GRANULOCYTES # BLD AUTO: 0 K/UL (ref 0–0.04)
IMM GRANULOCYTES NFR BLD AUTO: 0 % (ref 0–0.5)
KETONES UR QL STRIP.AUTO: 15 MG/DL
LEUKOCYTE ESTERASE UR QL STRIP.AUTO: ABNORMAL
LIPASE SERPL-CCNC: 16 U/L (ref 13–75)
LYMPHOCYTES # BLD: 0.7 K/UL (ref 0.8–3.5)
LYMPHOCYTES NFR BLD: 11 % (ref 12–49)
MCH RBC QN AUTO: 26.4 PG (ref 26–34)
MCHC RBC AUTO-ENTMCNC: 31.4 G/DL (ref 30–36.5)
MCV RBC AUTO: 84.1 FL (ref 80–99)
MONOCYTES # BLD: 0.7 K/UL (ref 0–1)
MONOCYTES NFR BLD: 10 % (ref 5–13)
NEUTS SEG # BLD: 5.4 K/UL (ref 1.8–8)
NEUTS SEG NFR BLD: 79 % (ref 32–75)
NITRITE UR QL STRIP.AUTO: NEGATIVE
NRBC # BLD: 0 K/UL (ref 0–0.01)
NRBC BLD-RTO: 0 PER 100 WBC
PH UR STRIP: 6 (ref 5–8)
PLATELET # BLD AUTO: 244 K/UL (ref 150–400)
PMV BLD AUTO: 10.7 FL (ref 8.9–12.9)
POTASSIUM SERPL-SCNC: 3.2 MMOL/L (ref 3.5–5.1)
PROT SERPL-MCNC: 8.2 G/DL (ref 6.4–8.2)
PROT UR STRIP-MCNC: 100 MG/DL
RBC # BLD AUTO: 4.47 M/UL (ref 3.8–5.2)
RBC #/AREA URNS HPF: ABNORMAL /HPF (ref 0–5)
RBC MORPH BLD: ABNORMAL
SODIUM SERPL-SCNC: 139 MMOL/L (ref 136–145)
SP GR UR REFRACTOMETRY: 1.03 (ref 1–1.03)
UROBILINOGEN UR QL STRIP.AUTO: 1 EU/DL (ref 0.2–1)
WBC # BLD AUTO: 6.8 K/UL (ref 3.6–11)
WBC URNS QL MICRO: ABNORMAL /HPF (ref 0–4)

## 2024-11-22 PROCEDURE — 99284 EMERGENCY DEPT VISIT MOD MDM: CPT

## 2024-11-22 PROCEDURE — 81001 URINALYSIS AUTO W/SCOPE: CPT

## 2024-11-22 PROCEDURE — 85025 COMPLETE CBC W/AUTO DIFF WBC: CPT

## 2024-11-22 PROCEDURE — 83690 ASSAY OF LIPASE: CPT

## 2024-11-22 PROCEDURE — 96374 THER/PROPH/DIAG INJ IV PUSH: CPT

## 2024-11-22 PROCEDURE — 36415 COLL VENOUS BLD VENIPUNCTURE: CPT

## 2024-11-22 PROCEDURE — 6360000002 HC RX W HCPCS: Performed by: EMERGENCY MEDICINE

## 2024-11-22 PROCEDURE — 94761 N-INVAS EAR/PLS OXIMETRY MLT: CPT

## 2024-11-22 PROCEDURE — 80053 COMPREHEN METABOLIC PANEL: CPT

## 2024-11-22 PROCEDURE — 6370000000 HC RX 637 (ALT 250 FOR IP): Performed by: EMERGENCY MEDICINE

## 2024-11-22 RX ORDER — SUCRALFATE 1 G/1
1 TABLET ORAL 4 TIMES DAILY
Qty: 20 TABLET | Refills: 0 | Status: SHIPPED | OUTPATIENT
Start: 2024-11-22 | End: 2024-11-27

## 2024-11-22 RX ORDER — ONDANSETRON 4 MG/1
4 TABLET, ORALLY DISINTEGRATING ORAL 3 TIMES DAILY PRN
Qty: 21 TABLET | Refills: 0 | Status: SHIPPED | OUTPATIENT
Start: 2024-11-22

## 2024-11-22 RX ORDER — ONDANSETRON 2 MG/ML
4 INJECTION INTRAMUSCULAR; INTRAVENOUS ONCE
Status: COMPLETED | OUTPATIENT
Start: 2024-11-22 | End: 2024-11-22

## 2024-11-22 RX ORDER — DICYCLOMINE HCL 20 MG
20 TABLET ORAL
Status: COMPLETED | OUTPATIENT
Start: 2024-11-22 | End: 2024-11-22

## 2024-11-22 RX ADMIN — DICYCLOMINE HYDROCHLORIDE 20 MG: 20 TABLET ORAL at 11:50

## 2024-11-22 RX ADMIN — ALUMINUM HYDROXIDE, MAGNESIUM HYDROXIDE, AND SIMETHICONE 40 ML: 1200; 120; 1200 SUSPENSION ORAL at 13:58

## 2024-11-22 RX ADMIN — ONDANSETRON 4 MG: 2 INJECTION, SOLUTION INTRAMUSCULAR; INTRAVENOUS at 11:50

## 2024-11-22 ASSESSMENT — PAIN DESCRIPTION - LOCATION: LOCATION: ABDOMEN

## 2024-11-22 ASSESSMENT — PAIN SCALES - GENERAL: PAINLEVEL_OUTOF10: 10

## 2024-11-22 ASSESSMENT — PAIN - FUNCTIONAL ASSESSMENT: PAIN_FUNCTIONAL_ASSESSMENT: 0-10

## 2024-11-22 NOTE — DISCHARGE INSTRUCTIONS
In addition to trialing Mylanta over-the-counter take the nausea medication and Carafate as directed and follow-up with your primary care doctor

## 2024-11-22 NOTE — ED TRIAGE NOTES
Pt presents to ED with c/o mid center \"stabbing abdominal pains\" x3 days after eating some old food that she left in her refrigerator. Pt reports no appetite, abdominal pain, and diarrhea. Denies rectal bleeding.

## 2024-12-02 DIAGNOSIS — E03.9 ACQUIRED HYPOTHYROIDISM: ICD-10-CM

## 2024-12-03 RX ORDER — LEVOTHYROXINE SODIUM 75 UG/1
TABLET ORAL
Qty: 96 TABLET | Refills: 1 | Status: SHIPPED | OUTPATIENT
Start: 2024-12-03

## 2024-12-03 RX ORDER — LEVOTHYROXINE SODIUM 75 UG/1
TABLET ORAL
Qty: 96 TABLET | Refills: 3 | OUTPATIENT
Start: 2024-12-03

## 2025-06-08 DIAGNOSIS — E03.9 ACQUIRED HYPOTHYROIDISM: ICD-10-CM

## 2025-06-09 RX ORDER — LEVOTHYROXINE SODIUM 75 UG/1
TABLET ORAL
Qty: 96 TABLET | Refills: 1 | Status: SHIPPED | OUTPATIENT
Start: 2025-06-09

## 2025-07-28 DIAGNOSIS — E03.9 ACQUIRED HYPOTHYROIDISM: ICD-10-CM

## 2025-09-03 ENCOUNTER — OFFICE VISIT (OUTPATIENT)
Age: 60
End: 2025-09-03
Payer: MEDICAID

## 2025-09-03 VITALS
HEIGHT: 67 IN | DIASTOLIC BLOOD PRESSURE: 69 MMHG | HEART RATE: 66 BPM | WEIGHT: 174 LBS | SYSTOLIC BLOOD PRESSURE: 110 MMHG | BODY MASS INDEX: 27.31 KG/M2

## 2025-09-03 DIAGNOSIS — E03.9 ACQUIRED HYPOTHYROIDISM: Primary | ICD-10-CM

## 2025-09-03 PROCEDURE — G2211 COMPLEX E/M VISIT ADD ON: HCPCS | Performed by: INTERNAL MEDICINE

## 2025-09-03 PROCEDURE — 99214 OFFICE O/P EST MOD 30 MIN: CPT | Performed by: INTERNAL MEDICINE

## 2025-09-03 RX ORDER — MULTIVITAMIN WITH IRON
1 TABLET ORAL DAILY
COMMUNITY

## 2025-09-04 LAB
T4 FREE SERPL-MCNC: 1.39 NG/DL (ref 0.82–1.77)
TSH SERPL DL<=0.005 MIU/L-ACNC: 4.27 UIU/ML (ref 0.45–4.5)